# Patient Record
Sex: MALE | HISPANIC OR LATINO | Employment: FULL TIME | URBAN - METROPOLITAN AREA
[De-identification: names, ages, dates, MRNs, and addresses within clinical notes are randomized per-mention and may not be internally consistent; named-entity substitution may affect disease eponyms.]

---

## 2017-05-15 ENCOUNTER — OFFICE VISIT (OUTPATIENT)
Dept: URGENT CARE | Facility: CLINIC | Age: 57
End: 2017-05-15
Payer: COMMERCIAL

## 2017-05-15 VITALS
BODY MASS INDEX: 33.6 KG/M2 | DIASTOLIC BLOOD PRESSURE: 80 MMHG | WEIGHT: 240 LBS | TEMPERATURE: 98.6 F | SYSTOLIC BLOOD PRESSURE: 142 MMHG | HEART RATE: 84 BPM | HEIGHT: 71 IN | OXYGEN SATURATION: 96 % | RESPIRATION RATE: 16 BRPM

## 2017-05-15 DIAGNOSIS — J02.9 PHARYNGITIS, UNSPECIFIED ETIOLOGY: ICD-10-CM

## 2017-05-15 DIAGNOSIS — R50.9 FEVER, UNSPECIFIED FEVER CAUSE: ICD-10-CM

## 2017-05-15 DIAGNOSIS — K59.00 CONSTIPATION, UNSPECIFIED CONSTIPATION TYPE: ICD-10-CM

## 2017-05-15 DIAGNOSIS — J10.1 INFLUENZA A: ICD-10-CM

## 2017-05-15 LAB
INT CON NEG: NORMAL
INT CON POS: NORMAL
S PYO AG THROAT QL: NEGATIVE

## 2017-05-15 PROCEDURE — 99203 OFFICE O/P NEW LOW 30 MIN: CPT | Performed by: PHYSICIAN ASSISTANT

## 2017-05-15 PROCEDURE — 87880 STREP A ASSAY W/OPTIC: CPT | Performed by: PHYSICIAN ASSISTANT

## 2017-05-15 RX ORDER — PROMETHAZINE HYDROCHLORIDE AND CODEINE PHOSPHATE 6.25; 1 MG/5ML; MG/5ML
5-10 SYRUP ORAL
Qty: 120 ML | Refills: 0 | Status: SHIPPED | OUTPATIENT
Start: 2017-05-15 | End: 2019-07-25

## 2017-05-15 RX ORDER — DAPAGLIFLOZIN AND METFORMIN HYDROCHLORIDE 5; 1000 MG/1; MG/1
TABLET, FILM COATED, EXTENDED RELEASE ORAL
COMMUNITY

## 2017-05-15 RX ORDER — OSELTAMIVIR PHOSPHATE 75 MG/1
75 CAPSULE ORAL 2 TIMES DAILY
Qty: 10 CAP | Refills: 0 | Status: SHIPPED | OUTPATIENT
Start: 2017-05-15 | End: 2019-07-25

## 2017-05-15 ASSESSMENT — ENCOUNTER SYMPTOMS
MYALGIAS: 0
WHEEZING: 1
HEADACHES: 1
CHILLS: 1
FEVER: 1
DIZZINESS: 0
SPUTUM PRODUCTION: 1
CONSTIPATION: 1
COUGH: 1
NAUSEA: 0
PALPITATIONS: 0
VOMITING: 0
ABDOMINAL PAIN: 0
SORE THROAT: 1

## 2017-05-15 NOTE — PATIENT INSTRUCTIONS
"Analgesics:  800mg advil, ibuprofen, motrin  Alternate every 4 hrs with  650-1000mg tylenol, acetaminophen  Do not exceed this dosage of either medication, this is the maximum dosage for pain and fever.        Influenza, Adult  Influenza (\"the flu\") is a viral infection of the respiratory tract. It occurs more often in winter months because people spend more time in close contact with one another. Influenza can make you feel very sick. Influenza easily spreads from person to person (contagious).  CAUSES   Influenza is caused by a virus that infects the respiratory tract. You can catch the virus by breathing in droplets from an infected person's cough or sneeze. You can also catch the virus by touching something that was recently contaminated with the virus and then touching your mouth, nose, or eyes.  RISKS AND COMPLICATIONS  You may be at risk for a more severe case of influenza if you smoke cigarettes, have diabetes, have chronic heart disease (such as heart failure) or lung disease (such as asthma), or if you have a weakened immune system. Elderly people and pregnant women are also at risk for more serious infections. The most common problem of influenza is a lung infection (pneumonia). Sometimes, this problem can require emergency medical care and may be life threatening.  SIGNS AND SYMPTOMS   Symptoms typically last 4 to 10 days and may include:  · Fever.  · Chills.  · Headache, body aches, and muscle aches.  · Sore throat.  · Chest discomfort and cough.  · Poor appetite.  · Weakness or feeling tired.  · Dizziness.  · Nausea or vomiting.  DIAGNOSIS   Diagnosis of influenza is often made based on your history and a physical exam. A nose or throat swab test can be done to confirm the diagnosis.  TREATMENT   In mild cases, influenza goes away on its own. Treatment is directed at relieving symptoms. For more severe cases, your health care provider may prescribe antiviral medicines to shorten the sickness. " Antibiotic medicines are not effective because the infection is caused by a virus, not by bacteria.  HOME CARE INSTRUCTIONS  · Take medicines only as directed by your health care provider.  · Use a cool mist humidifier to make breathing easier.  · Get plenty of rest until your temperature returns to normal. This usually takes 3 to 4 days.  · Drink enough fluid to keep your urine clear or pale yellow.  · Cover your mouth and nose when coughing or sneezing, and wash your hands well to prevent the virus from spreading.  · Stay home from work or school until the fever is gone for at least 1 full day.  PREVENTION   An annual influenza vaccination (flu shot) is the best way to avoid getting influenza. An annual flu shot is now routinely recommended for all adults in the [place]  SEEK MEDICAL CARE IF:  · You experience chest pain, your cough worsens, or you produce more mucus.  · You have nausea, vomiting, or diarrhea.  · Your fever returns or gets worse.  SEEK IMMEDIATE MEDICAL CARE IF:  · You have trouble breathing, you become short of breath, or your skin or nails become bluish.  · You have severe pain or stiffness in the neck.  · You develop a sudden headache, or pain in the face or ear.  · You have nausea or vomiting that you cannot control.  MAKE SURE YOU:   · Understand these instructions.  · Will watch your condition.  · Will get help right away if you are not doing well or get worse.     This information is not intended to replace advice given to you by your health care provider. Make sure you discuss any questions you have with your health care provider.     Document Released: 12/15/2001 Document Revised: 01/08/2016 Document Reviewed: 03/18/2013  [employer] Interactive Patient Education ©2016 [employer]

## 2017-05-15 NOTE — MR AVS SNAPSHOT
"        Kenney Espinal   5/15/2017 12:00 PM   Office Visit   MRN: [id number]    Department:  [facility]   Dept Phone:  [phone]    Description:  Male : 1960   Provider:  Marlene Black PA-C           Reason for Visit     Fever chills, sore throat just started, congestion,       Allergies as of 5/15/2017     Allergen Noted Reactions    Nkda [No Known Drug Allergy] 2008         You were diagnosed with     Pharyngitis, unspecified etiology   [3813423]       Fever, unspecified fever cause   [8867215]       Constipation, unspecified constipation type   [9595294]       Influenza A   [047945]         Vital Signs     Blood Pressure Pulse Temperature Respirations Height Weight    142/80 mmHg 84 37 °C (98.6 °F) 16 1.803 m (5' 11\") 108.863 kg (240 lb)    Body Mass Index Oxygen Saturation Smoking Status             33.49 kg/m2 96% Never Smoker          Basic Information     Date Of Birth Sex Race Ethnicity Preferred Language    1960 Male  or   Origin (Mongolian,Maltese,Tanzanian,Charly, etc) English      Problem List              ICD-10-CM Priority Class Noted - Resolved    DIABETES MELLITUS    2013 - Present    HTN (hypertension) I10   2013 - Present    Other chest pain R07.89   2013 - Present      Health Maintenance        Date Due Completion Dates    IMM HEP B VACCINE (1 of 3 - Primary Series) 1960 ---    A1C SCREENING 1960 ---    DIABETES MONOFILAMENT / LE EXAM 1960 ---    RETINAL SCREENING 1978 ---    FASTING LIPID PROFILE 1978 ---    URINE ACR / MICROALBUMIN 1978 ---    SERUM CREATININE 1978 ---    IMM DTaP/Tdap/Td Vaccine (1 - Tdap) 1979 ---    IMM PNEUMOCOCCAL 19-64 (ADULT) MEDIUM RISK SERIES (1 of 1 - PPSV23) 1979 ---    COLONOSCOPY 2010 ---            Results     POCT Rapid Strep A      Component    Rapid Strep Screen    NEGATIVE    Internal Control Positive    Valid    Internal Control Negative   " Valid                        Current Immunizations     No immunizations on file.      Below and/or attached are the medications your provider expects you to take. Review all of your home medications and newly ordered medications with your provider and/or pharmacist. Follow medication instructions as directed by your provider and/or pharmacist. Please keep your medication list with you and share with your provider. Update the information when medications are discontinued, doses are changed, or new medications (including over-the-counter products) are added; and carry medication information at all times in the event of emergency situations     Allergies:  NKDA - (reactions not documented)               Medications  Valid as of: May 15, 2017 - 12:57 PM    Generic Name Brand Name Tablet Size Instructions for use    Albiglutide (Pen-injector) Albiglutide 50 MG Inject  as instructed.        Aspirin (Chew Tab) ASA 81 MG Take 1 Tab by mouth every day.        Atorvastatin Calcium (Tab) LIPITOR 20 MG Take 1 Tab by mouth every day.        Carvedilol (Tab) COREG 6.25 MG TAKE ONE TABLET BY MOUTH TWICE A DAY WITH MEALS        Chlorthalidone (Tab) HYGROTON 25 MG Take 1 Tab by mouth every day. OVERDUE 1 YEAR!  PLEASE CALL TO MAKE AN APPOINTMENT!  THANK YOU        Dapagliflozin-Metformin HCl (TABLET SR 24 HR) Dapagliflozin-Metformin HCl ER 5-1000 MG Take  by mouth.        Insulin Glulisine (Solution) APIDRA 100 UNIT/ML Inject  as instructed 3 times a day before meals. 3-12 units sliding scale        Insulin Lispro Prot & Lispro (Suspension) INSULIN LISP PROT & LISP (HUM) (50-50) 100 UNIT/ML Inject  as instructed.          Losartan Potassium (Tab) COZAAR 50 MG Take 50 mg by mouth every day.          MetFORMIN HCl (Tab) GLUCOPHAGE 500 MG Take 1,000 mg by mouth 2 times a day.          Oseltamivir Phosphate (Cap) TAMIFLU 75 MG Take 1 Cap by mouth 2 times a day.        Promethazine-Codeine (Syrup) PHENERGAN-CODEINE 6.25-10 MG/5ML Take  "5-10 mL by mouth at bedtime as needed for Cough (May redose every 4-6 hours PRN).        .                 Medicines prescribed today were sent to:     [facility] #[id number] - [place], [place] - [address]    [address] [place] [address]    Phone: [phone] Fax: [phone]    Open 24 Hours?: No      Medication refill instructions:       If your prescription bottle indicates you have medication refills left, it is not necessary to call your provider’s office. Please contact your pharmacy and they will refill your medication.    If your prescription bottle indicates you do not have any refills left, you may request refills at any time through one of the following ways: The online [employer] system (except Urgent Care), by calling your provider’s office, or by asking your pharmacy to contact your provider’s office with a refill request. Medication refills are processed only during regular business hours and may not be available until the next business day. Your provider may request additional information or to have a follow-up visit with you prior to refilling your medication.   *Please Note: Medication refills are assigned a new Rx number when refilled electronically. Your pharmacy may indicate that no refills were authorized even though a new prescription for the same medication is available at the pharmacy. Please request the medicine by name with the pharmacy before contacting your provider for a refill.        Instructions    Analgesics:  800mg advil, ibuprofen, motrin  Alternate every 4 hrs with  650-1000mg tylenol, acetaminophen  Do not exceed this dosage of either medication, this is the maximum dosage for pain and fever.        Influenza, Adult  Influenza (\"the flu\") is a viral infection of the respiratory tract. It occurs more often in winter months because people spend more time in close contact with one another. Influenza can make you feel very sick. Influenza easily spreads from " person to person (contagious).  CAUSES   Influenza is caused by a virus that infects the respiratory tract. You can catch the virus by breathing in droplets from an infected person's cough or sneeze. You can also catch the virus by touching something that was recently contaminated with the virus and then touching your mouth, nose, or eyes.  RISKS AND COMPLICATIONS  You may be at risk for a more severe case of influenza if you smoke cigarettes, have diabetes, have chronic heart disease (such as heart failure) or lung disease (such as asthma), or if you have a weakened immune system. Elderly people and pregnant women are also at risk for more serious infections. The most common problem of influenza is a lung infection (pneumonia). Sometimes, this problem can require emergency medical care and may be life threatening.  SIGNS AND SYMPTOMS   Symptoms typically last 4 to 10 days and may include:  · Fever.  · Chills.  · Headache, body aches, and muscle aches.  · Sore throat.  · Chest discomfort and cough.  · Poor appetite.  · Weakness or feeling tired.  · Dizziness.  · Nausea or vomiting.  DIAGNOSIS   Diagnosis of influenza is often made based on your history and a physical exam. A nose or throat swab test can be done to confirm the diagnosis.  TREATMENT   In mild cases, influenza goes away on its own. Treatment is directed at relieving symptoms. For more severe cases, your health care provider may prescribe antiviral medicines to shorten the sickness. Antibiotic medicines are not effective because the infection is caused by a virus, not by bacteria.  HOME CARE INSTRUCTIONS  · Take medicines only as directed by your health care provider.  · Use a cool mist humidifier to make breathing easier.  · Get plenty of rest until your temperature returns to normal. This usually takes 3 to 4 days.  · Drink enough fluid to keep your urine clear or pale yellow.  · Cover your mouth and nose when coughing or sneezing, and wash your  hands well to prevent the virus from spreading.  · Stay home from work or school until the fever is gone for at least 1 full day.  PREVENTION   An annual influenza vaccination (flu shot) is the best way to avoid getting influenza. An annual flu shot is now routinely recommended for all adults in the [place]  SEEK MEDICAL CARE IF:  · You experience chest pain, your cough worsens, or you produce more mucus.  · You have nausea, vomiting, or diarrhea.  · Your fever returns or gets worse.  SEEK IMMEDIATE MEDICAL CARE IF:  · You have trouble breathing, you become short of breath, or your skin or nails become bluish.  · You have severe pain or stiffness in the neck.  · You develop a sudden headache, or pain in the face or ear.  · You have nausea or vomiting that you cannot control.  MAKE SURE YOU:   · Understand these instructions.  · Will watch your condition.  · Will get help right away if you are not doing well or get worse.     This information is not intended to replace advice given to you by your health care provider. Make sure you discuss any questions you have with your health care provider.     Document Released: 12/15/2001 Document Revised: 01/08/2016 Document Reviewed: 03/18/2013  [email or web address] Interactive Patient Education ©2016 [employer].            Shock Treatment Management Access Code: [email or web address]  Expires: 6/14/2017 12:57 PM    Your email address is not on file at [facility].  Email Addresses are required for you to sign up for Shock Treatment Management, please contact [phone] to verify your personal information and to provide your email address prior to attempting to register for Shock Treatment Management.    Kenney Espinal  [address] [place] [address]    Shock Treatment Management  A secure, online tool to manage your health information     [facility]’s Shock Treatment Management® is a secure, online tool that connects you to your personalized health information from the privacy of your home -- day or night - making it very easy for you to manage your healthcare. Once  the activation process is completed, you can even access your medical information using the [employer] josé, which is available for free in the Apple José store or Google Play store.     To learn more about [employer], visit [email or web address]    There are two levels of access available (as shown below):   My Chart Features  [facility]  Urgent  Care Non-[facility] Doctor   Email your healthcare team securely and privately 24/7 X X X    Manage appointments: schedule your next appointment; view details of past/upcoming appointments X      Request prescription refills. X      View recent personal medical records, including lab and immunizations X X X X   View health record, including health history, allergies, medications X X X X   Read reports about your outpatient visits, procedures, consult and ER notes X X X X   See your discharge summary, which is a recap of your hospital and/or ER visit that includes your diagnosis, lab results, and care plan X X  X     How to register for [employer]:  Once your e-mail address has been verified, follow the following steps to sign up for [employer].     1. Go to  [email or web address]  2. Click on the Sign Up Now box, which takes you to the New Member Sign Up page. You will need to provide the following information:  a. Enter your [employer] Access Code exactly as it appears at the top of this page. (You will not need to use this code after you’ve completed the sign-up process. If you do not sign up before the expiration date, you must request a new code.)   b. Enter your date of birth.   c. Enter your home email address.   d. Click Submit, and follow the next screen’s instructions.  3. Create a [employer] ID. This will be your [employer] login ID and cannot be changed, so think of one that is secure and easy to remember.  4. Create a [employer] password. You can change your password at any time.  5. Enter your Password Reset Question and Answer.  This can be used at a later time if you forget your password.   6. Enter your e-mail address. This allows you to receive e-mail notifications when new information is available in [employer].  7. Click Sign Up. You can now view your health information.    For assistance activating your [employer] account, call [phone]

## 2017-05-15 NOTE — PROGRESS NOTES
"Subjective:      Kenney Espinal is a 57 y.o. male who presents with Fever    Current medications reviewed.  Past Medical History   Diagnosis Date   • Diabetes    • Diabetes mellitus 1/8/2013   • HTN (hypertension) 1/8/2013   • Other chest pain 1/8/2013     Social History   Substance Use Topics   • Smoking status: Never Smoker    • Smokeless tobacco: Never Used   • Alcohol Use: 2.0 oz/week     4 Cans of beer per week      Comment: social/occasional     Family History Reviewed: noncontributory      3 days ill: 101 fevers with chills, myalgias, moderate sore throat, has been constipated for the past 2 days.   Left ear is painful.  Patient is insulin dependent diabetic, he was not ill until after he picked sister up at airport, she became ill first.      Fever   Associated symptoms include congestion, coughing, ear pain (left), headaches, a sore throat and wheezing. Pertinent negatives include no abdominal pain, chest pain, nausea or vomiting.       Review of Systems   Constitutional: Positive for fever, chills and malaise/fatigue.   HENT: Positive for congestion, ear pain (left) and sore throat.    Respiratory: Positive for cough, sputum production (very mild, mostly dry cough) and wheezing.    Cardiovascular: Negative for chest pain and palpitations.   Gastrointestinal: Positive for constipation. Negative for nausea, vomiting and abdominal pain.   Musculoskeletal: Negative for myalgias and joint pain.   Neurological: Positive for headaches. Negative for dizziness.   All other systems reviewed and are negative.         Objective:     /80 mmHg  Pulse 84  Temp(Src) 37 °C (98.6 °F)  Resp 16  Ht 1.803 m (5' 11\")  Wt 108.863 kg (240 lb)  BMI 33.49 kg/m2  SpO2 96%     Physical Exam   Constitutional: He is oriented to person, place, and time. He appears well-developed and well-nourished.   HENT:   Right Ear: Ear canal normal. A middle ear effusion is present.   Left Ear: Ear canal normal. A middle ear effusion " is present.   Nose: Mucosal edema (mild with mod eryth) present.   Mouth/Throat: Posterior oropharyngeal edema (mild) and posterior oropharyngeal erythema (mild) present.   Eyes: EOM are normal. Pupils are equal, round, and reactive to light.   Cardiovascular: Normal rate and normal heart sounds.    Pulmonary/Chest: Effort normal and breath sounds normal. He has no decreased breath sounds.   Abdominal: He exhibits distension. Bowel sounds are increased. There is tenderness (mild) in the periumbilical area. There is no rigidity, no rebound, no guarding, no CVA tenderness, no tenderness at McBurney's point and negative Ferrer's sign.   Neurological: He is alert and oriented to person, place, and time.   Skin: Skin is warm and dry.   Nursing note and vitals reviewed.              Assessment/Plan:     1. Pharyngitis, unspecified etiology  POCT Rapid Strep A   2. Fever, unspecified fever cause     3. Constipation, unspecified constipation type     4. Influenza A       Strep neg in office  Sister in same clinic, tested for flu and positive for influenza A  Will start tamiflu, note given for work, all questions answered, work note given.  Prometh/Cod cough syrup: Advised no drinking etoh, driving, or operating heavy equipment while taking.  Pt was advised of the sedation effect of these medications.  reviewed.  Patient reports understanding and agrees with plan.

## 2017-05-15 NOTE — Clinical Note
May 15, 2017         Patient: Kenney Espinal   YOB: 1960   Date of Visit: 5/15/2017           To Whom it May Concern:    Kenney Espinal was seen in my clinic on 5/15/2017. He may return to work when fever has resolved for 24 hrs, Influenza A is in his household.    If you have any questions or concerns, please don't hesitate to call.        Sincerely,           Marlene Black PA-C  Electronically Signed

## 2019-01-21 ENCOUNTER — TELEPHONE (OUTPATIENT)
Dept: MEDICAL GROUP | Facility: MEDICAL CENTER | Age: 59
End: 2019-01-21

## 2019-01-21 NOTE — TELEPHONE ENCOUNTER
VOICEMAIL  1. Caller Name: Kenney Espinal                        Call Back Number: [phone] (home) [phone] (work)     2. Message: Patient called and left message wanting to know if Dr. Andrew is taking new patient. I have called patient back and left a message stating Dr. Andrew is taking new patient but we are booked out until June and that we are in [place].     3. Patient approves office to leave a detailed voicemail/MyChart message: N\A

## 2019-01-24 ENCOUNTER — HOSPITAL ENCOUNTER (OUTPATIENT)
Dept: LAB | Facility: MEDICAL CENTER | Age: 59
End: 2019-01-24
Attending: CHIROPRACTOR
Payer: COMMERCIAL

## 2019-01-24 ENCOUNTER — HOSPITAL ENCOUNTER (OUTPATIENT)
Dept: LAB | Facility: MEDICAL CENTER | Age: 59
End: 2019-01-24
Attending: PHYSICIAN ASSISTANT
Payer: COMMERCIAL

## 2019-01-24 LAB
25(OH)D3 SERPL-MCNC: 33 NG/ML (ref 30–100)
ERYTHROCYTE [SEDIMENTATION RATE] IN BLOOD BY WESTERGREN METHOD: 15 MM/HOUR (ref 0–20)
EST. AVERAGE GLUCOSE BLD GHB EST-MCNC: 197 MG/DL
HBA1C MFR BLD: 8.5 % (ref 0–5.6)

## 2019-01-24 PROCEDURE — 82306 VITAMIN D 25 HYDROXY: CPT

## 2019-01-24 PROCEDURE — 85652 RBC SED RATE AUTOMATED: CPT

## 2019-01-24 PROCEDURE — 83036 HEMOGLOBIN GLYCOSYLATED A1C: CPT

## 2019-01-24 PROCEDURE — 82985 ASSAY OF GLYCATED PROTEIN: CPT

## 2019-01-24 PROCEDURE — 82043 UR ALBUMIN QUANTITATIVE: CPT

## 2019-01-24 PROCEDURE — 36415 COLL VENOUS BLD VENIPUNCTURE: CPT

## 2019-01-24 PROCEDURE — 82570 ASSAY OF URINE CREATININE: CPT

## 2019-01-25 LAB — FRUCTOSAMINE SERPL-SCNC: 308 UMOL/L (ref 170–285)

## 2019-01-26 LAB
CREAT UR-MCNC: 120.4 MG/DL
MICROALBUMIN UR-MCNC: 5 MG/DL
MICROALBUMIN/CREAT UR: 42 MG/G (ref 0–30)

## 2019-01-28 ENCOUNTER — TELEPHONE (OUTPATIENT)
Dept: MEDICAL GROUP | Facility: MEDICAL CENTER | Age: 59
End: 2019-01-28

## 2019-01-28 NOTE — TELEPHONE ENCOUNTER
VOICEMAIL  1. Caller Name: Kenney Espinal                        Call Back Number: [phone] (home) [phone] (work)    2. Message: Patient called and left message wanting to know if Dr. Andrew is taking new patient. I have called patient back and left a message stating Dr. Andrew is taking new patient but we are booked out until June and that we are in [place].  (Patient called a second time)     3. Patient approves office to leave a detailed voicemail/MyChart message: yes

## 2019-04-18 ENCOUNTER — OFFICE VISIT (OUTPATIENT)
Dept: URGENT CARE | Facility: MEDICAL CENTER | Age: 59
End: 2019-04-18
Payer: COMMERCIAL

## 2019-04-18 VITALS
HEART RATE: 87 BPM | WEIGHT: 240.8 LBS | BODY MASS INDEX: 33.71 KG/M2 | HEIGHT: 71 IN | OXYGEN SATURATION: 98 % | TEMPERATURE: 98.1 F | DIASTOLIC BLOOD PRESSURE: 84 MMHG | SYSTOLIC BLOOD PRESSURE: 142 MMHG

## 2019-04-18 DIAGNOSIS — R05.9 COUGH: ICD-10-CM

## 2019-04-18 DIAGNOSIS — J22 LRTI (LOWER RESPIRATORY TRACT INFECTION): ICD-10-CM

## 2019-04-18 PROCEDURE — 99214 OFFICE O/P EST MOD 30 MIN: CPT | Performed by: NURSE PRACTITIONER

## 2019-04-18 RX ORDER — BENZONATATE 100 MG/1
100 CAPSULE ORAL 3 TIMES DAILY PRN
Qty: 60 CAP | Refills: 0 | Status: SHIPPED | OUTPATIENT
Start: 2019-04-18 | End: 2019-07-25

## 2019-04-18 RX ORDER — DOXYCYCLINE HYCLATE 100 MG/1
100 CAPSULE ORAL 2 TIMES DAILY
Qty: 20 CAP | Refills: 0 | Status: SHIPPED | OUTPATIENT
Start: 2019-04-18 | End: 2019-04-28

## 2019-04-18 ASSESSMENT — ENCOUNTER SYMPTOMS
SWEATS: 0
WHEEZING: 0
NAUSEA: 0
MYALGIAS: 1
SHORTNESS OF BREATH: 1
HEMOPTYSIS: 0
WEIGHT LOSS: 0
CHILLS: 1
HEADACHES: 1
HEARTBURN: 0
COUGH: 1
SORE THROAT: 0
RHINORRHEA: 0
FEVER: 0

## 2019-04-18 ASSESSMENT — LIFESTYLE VARIABLES: SUBSTANCE_ABUSE: 0

## 2019-04-18 ASSESSMENT — COPD QUESTIONNAIRES: COPD: 0

## 2019-04-18 NOTE — LETTER
April 18, 2019       Patient: Kenney Espinal   YOB: 1960   Date of Visit: 4/18/2019         To Whom It May Concern:    It is my medical opinion that Kenney Espinal remain out of work until 04/20/19 due to medical illness.              Sincerely,          TABITHA Bob  Electronically Signed

## 2019-04-19 NOTE — PROGRESS NOTES
"Subjective:      Kenney Espinal is a 58 y.o. male who presents with Cough (x1 week; dry cough; headache; body aches)    Reviewed past medical, surgical and family history with patient. Reviewed prescription and OTC medications with patient in electronic health record today.       Allergies   Allergen Reactions   • Nkda [No Known Drug Allergy]              Cough   This is a new problem. The current episode started in the past 7 days. The problem has been gradually worsening. The cough is non-productive. Associated symptoms include chills, ear pain, headaches, myalgias, nasal congestion, postnasal drip and shortness of breath. Pertinent negatives include no chest pain, ear congestion, fever, heartburn, hemoptysis, rash, rhinorrhea, sore throat, sweats, weight loss or wheezing. Nothing aggravates the symptoms. He has tried OTC cough suppressant for the symptoms. The treatment provided mild relief. There is no history of asthma, bronchiectasis, bronchitis, COPD, emphysema, environmental allergies or pneumonia.       Review of Systems   Constitutional: Positive for chills. Negative for fever and weight loss.   HENT: Positive for ear pain and postnasal drip. Negative for rhinorrhea and sore throat.    Respiratory: Positive for cough and shortness of breath. Negative for hemoptysis and wheezing.    Cardiovascular: Negative for chest pain.   Gastrointestinal: Negative for heartburn and nausea.   Musculoskeletal: Positive for myalgias.   Skin: Negative for rash.   Neurological: Positive for headaches.   Endo/Heme/Allergies: Negative for environmental allergies.   Psychiatric/Behavioral: Negative for substance abuse.          Objective:     /84   Pulse 87   Temp 36.7 °C (98.1 °F) (Temporal)   Ht 1.803 m (5' 11\")   Wt 109.2 kg (240 lb 12.8 oz)   SpO2 98%   BMI 33.58 kg/m²      Physical Exam   Constitutional: He is oriented to person, place, and time. Vital signs are normal. He appears well-developed and " well-nourished.  Non-toxic appearance. No distress.   HENT:   Head: Normocephalic and atraumatic.   Right Ear: External ear normal.   Left Ear: External ear normal.   Nose: Nose normal.   Mouth/Throat: Oropharynx is clear and moist.   Eyes: Pupils are equal, round, and reactive to light. Conjunctivae are normal. Right eye exhibits no discharge. Left eye exhibits no discharge.   Neck: Neck supple.   Cardiovascular: Normal rate and regular rhythm.    Pulmonary/Chest: Effort normal. No accessory muscle usage. He has rhonchi (+ egophony , + dullness by percussion) in the left lower field.   Lymphadenopathy:     He has no cervical adenopathy.        Right: No supraclavicular adenopathy present.        Left: No supraclavicular adenopathy present.   Neurological: He is alert and oriented to person, place, and time.   Skin: Skin is warm and dry.   Nursing note and vitals reviewed.              Assessment/Plan:     1. LRTI (lower respiratory tract infection)  doxycycline (VIBRAMYCIN) 100 MG Cap   2. Cough  benzonatate (TESSALON) 100 MG Cap       Educated in proper administration of medication(s) ordered today including safety, possible SE, risks, benefits, rationale and alternatives to therapy.     Return to clinic or PCP 5-7  days if current symptoms are not resolving in a satisfactory manner or sooner if new or worsening symptoms occur.   Patient was advised of signs and symptoms which would warrant further evaluation and /or emergent evaluation in ER.  Verbalized agreement with this treatment plan and seemed to understand without barriers. Questions were encouraged and answered to patients satisfaction.   Aftercare instructions were given to pt    Keep well hydrated

## 2019-06-24 ENCOUNTER — HOSPITAL ENCOUNTER (OUTPATIENT)
Dept: LAB | Facility: MEDICAL CENTER | Age: 59
End: 2019-06-24
Attending: INTERNAL MEDICINE
Payer: COMMERCIAL

## 2019-06-24 LAB
ALBUMIN SERPL BCP-MCNC: 4.2 G/DL (ref 3.2–4.9)
ALBUMIN/GLOB SERPL: 1.6 G/DL
ALP SERPL-CCNC: 58 U/L (ref 30–99)
ALT SERPL-CCNC: 14 U/L (ref 2–50)
ANION GAP SERPL CALC-SCNC: 7 MMOL/L (ref 0–11.9)
AST SERPL-CCNC: 19 U/L (ref 12–45)
BILIRUB SERPL-MCNC: 0.7 MG/DL (ref 0.1–1.5)
BUN SERPL-MCNC: 16 MG/DL (ref 8–22)
CALCIUM SERPL-MCNC: 9.2 MG/DL (ref 8.5–10.5)
CHLORIDE SERPL-SCNC: 104 MMOL/L (ref 96–112)
CHOLEST SERPL-MCNC: 112 MG/DL (ref 100–199)
CO2 SERPL-SCNC: 28 MMOL/L (ref 20–33)
CREAT SERPL-MCNC: 0.87 MG/DL (ref 0.5–1.4)
EST. AVERAGE GLUCOSE BLD GHB EST-MCNC: 194 MG/DL
FASTING STATUS PATIENT QL REPORTED: NORMAL
GLOBULIN SER CALC-MCNC: 2.7 G/DL (ref 1.9–3.5)
GLUCOSE SERPL-MCNC: 72 MG/DL (ref 65–99)
HBA1C MFR BLD: 8.4 % (ref 0–5.6)
HDLC SERPL-MCNC: 50 MG/DL
LDLC SERPL CALC-MCNC: 49 MG/DL
POTASSIUM SERPL-SCNC: 4 MMOL/L (ref 3.6–5.5)
PROT SERPL-MCNC: 6.9 G/DL (ref 6–8.2)
SODIUM SERPL-SCNC: 139 MMOL/L (ref 135–145)
TRIGL SERPL-MCNC: 65 MG/DL (ref 0–149)

## 2019-06-24 PROCEDURE — 80053 COMPREHEN METABOLIC PANEL: CPT

## 2019-06-24 PROCEDURE — 80061 LIPID PANEL: CPT

## 2019-06-24 PROCEDURE — 83036 HEMOGLOBIN GLYCOSYLATED A1C: CPT

## 2019-06-24 PROCEDURE — 36415 COLL VENOUS BLD VENIPUNCTURE: CPT

## 2019-07-25 ENCOUNTER — OFFICE VISIT (OUTPATIENT)
Dept: MEDICAL GROUP | Facility: MEDICAL CENTER | Age: 59
End: 2019-07-25
Payer: COMMERCIAL

## 2019-07-25 VITALS
RESPIRATION RATE: 14 BRPM | WEIGHT: 237.22 LBS | TEMPERATURE: 98.2 F | BODY MASS INDEX: 33.21 KG/M2 | DIASTOLIC BLOOD PRESSURE: 88 MMHG | SYSTOLIC BLOOD PRESSURE: 126 MMHG | OXYGEN SATURATION: 96 % | HEIGHT: 71 IN | HEART RATE: 69 BPM

## 2019-07-25 DIAGNOSIS — Z79.4 TYPE 2 DIABETES MELLITUS WITH BOTH EYES AFFECTED BY RETINOPATHY WITHOUT MACULAR EDEMA, WITH LONG-TERM CURRENT USE OF INSULIN, UNSPECIFIED RETINOPATHY SEVERITY (HCC): ICD-10-CM

## 2019-07-25 DIAGNOSIS — E11.319 TYPE 2 DIABETES MELLITUS WITH BOTH EYES AFFECTED BY RETINOPATHY WITHOUT MACULAR EDEMA, WITH LONG-TERM CURRENT USE OF INSULIN, UNSPECIFIED RETINOPATHY SEVERITY (HCC): ICD-10-CM

## 2019-07-25 DIAGNOSIS — E66.9 OBESITY (BMI 30-39.9): ICD-10-CM

## 2019-07-25 DIAGNOSIS — E78.2 MIXED HYPERLIPIDEMIA: ICD-10-CM

## 2019-07-25 DIAGNOSIS — G89.29 CHRONIC PAIN OF BOTH SHOULDERS: ICD-10-CM

## 2019-07-25 DIAGNOSIS — I10 ESSENTIAL HYPERTENSION: ICD-10-CM

## 2019-07-25 DIAGNOSIS — M25.511 CHRONIC PAIN OF BOTH SHOULDERS: ICD-10-CM

## 2019-07-25 DIAGNOSIS — K21.00 GASTROESOPHAGEAL REFLUX DISEASE WITH ESOPHAGITIS: ICD-10-CM

## 2019-07-25 DIAGNOSIS — M25.512 CHRONIC PAIN OF BOTH SHOULDERS: ICD-10-CM

## 2019-07-25 PROCEDURE — 99204 OFFICE O/P NEW MOD 45 MIN: CPT | Performed by: FAMILY MEDICINE

## 2019-07-25 RX ORDER — CARVEDILOL 6.25 MG/1
6.25 TABLET ORAL 2 TIMES DAILY WITH MEALS
COMMUNITY

## 2019-07-25 RX ORDER — ATORVASTATIN CALCIUM 40 MG/1
40 TABLET, FILM COATED ORAL NIGHTLY
COMMUNITY

## 2019-07-25 RX ORDER — OMEPRAZOLE 20 MG/1
20 CAPSULE, DELAYED RELEASE ORAL DAILY
Qty: 90 CAP | Refills: 0 | Status: SHIPPED | OUTPATIENT
Start: 2019-07-25 | End: 2019-11-12 | Stop reason: SDUPTHER

## 2019-07-25 ASSESSMENT — PATIENT HEALTH QUESTIONNAIRE - PHQ9: CLINICAL INTERPRETATION OF PHQ2 SCORE: 0

## 2019-07-25 NOTE — LETTER
[facility]  Liza Andrew M.D.  [address]  [place] [address]  Fax: [phone]   Authorization for Release/Disclosure of   Protected Health Information   Name: PAULINA ESPINAL : 1960 SSN: [id number]   Address: [address] [place] [address]  [place] [address] Phone:    [phone] (home) [phone] (work)   I authorize the entity listed below to release/disclose the PHI below to:   [facility]/Liza Andrew M.D. and Liza Andrew M.D.   Provider or Entity Name:     Address   City, State, Zip   Phone:      Fax:     Reason for request: continuity of care   Information to be released:    [  ] LAST COLONOSCOPY,  including any PATH REPORT and follow-up  [  ] LAST FIT/COLOGUARD RESULT [  ] LAST DEXA  [  ] LAST MAMMOGRAM  [  ] LAST PAP  [  ] LAST LABS [  ] RETINA EXAM REPORT  [  ] IMMUNIZATION RECORDS  [  ] Release all info      [  ] Check here and initial the line next to each item to release ALL health information INCLUDING  _____ Care and treatment for drug and / or alcohol abuse  _____ HIV testing, infection status, or AIDS  _____ Genetic Testing    DATES OF SERVICE OR TIME PERIOD TO BE DISCLOSED: _____________  I understand and acknowledge that:  * This Authorization may be revoked at any time by you in writing, except if your health information has already been used or disclosed.  * Your health information that will be used or disclosed as a result of you signing this authorization could be re-disclosed by the recipient. If this occurs, your re-disclosed health information may no longer be protected by State or Federal laws.  * You may refuse to sign this Authorization. Your refusal will not affect your ability to obtain treatment.  * This Authorization becomes effective upon signing and will  on (date) __________.      If no date is indicated, this Authorization will  one (1) year from the signature date.    Name: Paulina Espinal    Signature:   Date:     2019       PLEASE FAX REQUESTED RECORDS BACK TO: [phone]

## 2019-07-25 NOTE — ASSESSMENT & PLAN NOTE
This is a chronic health problem for this patient that was diagnosed approximately 1996.  He sees Dr. Laura Blanc and she is working with him to try and get his diabetes under better control.  His fasting glucose in June was excellent at 72 but his A1c is still slightly elevated at 8.4.  Would like to see it down in the sevens.  Initially it was up in the 10 level so they have done a great job helping it to come down.  We will continue to monitor.

## 2019-07-25 NOTE — ASSESSMENT & PLAN NOTE
This is a chronic problem going on for nearly a month.  Patient notes that the right shoulder is particularly bad he has difficulty even trying to pick things up with his right hand.  His abduction only goes to 90 degrees and his empty can test is positive on the right.  He states that his left shoulder is very similar.  He has had some diagnostic studies done at [facility].  We will get those records and then go over them when we see him back.

## 2019-07-25 NOTE — ASSESSMENT & PLAN NOTE
This is a new problem ongoing about 5 weeks.  Patient has noted that when he eats he will have to cough once or twice and then that seems to clear things but he is having more acid production.  I am going to have him try a proton pump inhibitor such as omeprazole 20 mg daily.  He has to take it 30 minutes before protein containing meal which for him were going to have it be before his morning yogurt.

## 2019-07-25 NOTE — ASSESSMENT & PLAN NOTE
This is a chronic health problem for this patient that is very well controlled on atorvastatin 40 mg daily.  Patient just did blood work for his endocrinologist and his total cholesterol is down to 112, triglycerides 65, HDL 50 LDL is 49.  There is no liver dysfunction.  He will continue with those same meds.

## 2019-07-25 NOTE — ASSESSMENT & PLAN NOTE
This is a chronic health problem for this patient for which she is on carvedilol 6.25 mg twice daily and losartan 50 mg daily.  He did blood work in June and his comprehensive metabolic panel was normal with a GFR greater than 60.  Blood pressure today is 126/88.  He is meeting me for the first time.  We will continue to monitor his blood pressures.  Ideally would like to see him less than 120/80.

## 2019-07-26 NOTE — PROGRESS NOTES
CC:  Diagnoses of Mixed hyperlipidemia, Essential hypertension, Type 2 diabetes mellitus with both eyes affected by retinopathy without macular edema, with long-term current use of insulin, unspecified retinopathy severity (HCC), Gastroesophageal reflux disease with esophagitis, Chronic pain of both shoulders, and Obesity (BMI 30-39.9) were pertinent to this visit.    HISTORY OF THE PRESENT ILLNESS: Patient is a 59 y.o. male. This pleasant patient is here today to establish care.  He previously was seen by Dr. Shukla with [facility].  We will request those records.  Health Maintenance: Completed      Mixed hyperlipidemia  This is a chronic health problem for this patient that is very well controlled on atorvastatin 40 mg daily.  Patient just did blood work for his endocrinologist and his total cholesterol is down to 112, triglycerides 65, HDL 50 LDL is 49.  There is no liver dysfunction.  He will continue with those same meds.    HTN (hypertension)  This is a chronic health problem for this patient for which she is on carvedilol 6.25 mg twice daily and losartan 50 mg daily.  He did blood work in June and his comprehensive metabolic panel was normal with a GFR greater than 60.  Blood pressure today is 126/88.  He is meeting me for the first time.  We will continue to monitor his blood pressures.  Ideally would like to see him less than 120/80.    Type 2 diabetes mellitus with retinopathy, with long-term current use of insulin (HCC)  This is a chronic health problem for this patient that was diagnosed approximately 1996.  He sees Dr. Laura Blanc and she is working with him to try and get his diabetes under better control.  His fasting glucose in June was excellent at 72 but his A1c is still slightly elevated at 8.4.  Would like to see it down in the sevens.  Initially it was up in the 10 level so they have done a great job helping it to come down.  We will continue to monitor.    Gastroesophageal reflux disease with  esophagitis  This is a new problem ongoing about 5 weeks.  Patient has noted that when he eats he will have to cough once or twice and then that seems to clear things but he is having more acid production.  I am going to have him try a proton pump inhibitor such as omeprazole 20 mg daily.  He has to take it 30 minutes before protein containing meal which for him were going to have it be before his morning yogurt.    Chronic pain of both shoulders  This is a chronic problem going on for nearly a month.  Patient notes that the right shoulder is particularly bad he has difficulty even trying to pick things up with his right hand.  His abduction only goes to 90 degrees and his empty can test is positive on the right.  He states that his left shoulder is very similar.  He has had some diagnostic studies done at [facility].  We will get those records and then go over them when we see him back.    Allergies: Nkda [no known drug allergy]    Current Outpatient Prescriptions Ordered in [facility]   Medication Sig Dispense Refill   • Dulaglutide (TRULICITY) 0.75 MG/0.5ML Solution Pen-injector Inject  as instructed.     • NOVOLOG, insulin aspart, (NOVOLOG FLEXPEN) 100 UNIT/ML Solution Pen-injector injection Inject 10 Units as instructed every day.     • Insulin Glargine (TOUJEO MAX SOLOSTAR) 300 UNIT/ML Solution Pen-injector Inject 64 Units as instructed every day.     • carvedilol (COREG) 6.25 MG Tab Take 6.25 mg by mouth 2 times a day, with meals.     • atorvastatin (LIPITOR) 40 MG Tab Take 40 mg by mouth every evening.     • omeprazole (PRILOSEC) 20 MG delayed-release capsule Take 1 Cap by mouth every day. 90 Cap 0   • Dapagliflozin-Metformin HCl ER (XIGDUO XR) 5-1000 MG TABLET SR 24 HR Take  by mouth.     • Albiglutide (TANZEUM) 50 MG Pen-injector Inject  as instructed.     • losartan (COZAAR) 50 MG TABS Take 50 mg by mouth every day.         No current Epic-ordered facility-administered medications on file.        Past  Medical History:   Diagnosis Date   • Chronic pain of both shoulders 7/25/2019   • Diabetes    • DIABETES MELLITUS 1/8/2013   • Gastroesophageal reflux disease with esophagitis 7/25/2019   • HTN (hypertension) 1/8/2013   • Mixed hyperlipidemia 7/25/2019   • Other chest pain 1/8/2013       Past Surgical History:   Procedure Laterality Date   • OTHER ORTHOPEDIC SURGERY      L knee   • TONSILLECTOMY         Social History   Substance Use Topics   • Smoking status: Never Smoker   • Smokeless tobacco: Never Used   • Alcohol use No      Comment: social/occasional       Social History     Social History Narrative   • No narrative on file       Family History   Problem Relation Age of Onset   • Diabetes Mother    • Hypertension Mother    • Hyperlipidemia Mother    • Diabetes Father    • Hypertension Father    • Hyperlipidemia Father    • Cancer Sister         breast       ROS:     - Constitutional: Negative for fever, chills, unexpected weight change, and fatigue/generalized weakness.     - HEENT: Negative for headaches, vision changes, hearing changes, ear pain, ear discharge, rhinorrhea, sinus congestion, sore throat, and neck pain.      - Respiratory: Negative for cough, sputum production, chest congestion, dyspnea, wheezing, and crackles.      - Cardiovascular: Negative for chest pain, palpitations, orthopnea, and bilateral lower extremity edema.     - Gastrointestinal: Positive for heartburn otherwise denies, nausea, vomiting, abdominal pain, hematochezia, melena, diarrhea, constipation, and greasy/foul-smelling stools.     - Genitourinary: Negative for dysuria, polyuria, hematuria, pyuria, urinary urgency, and urinary incontinence.     - Musculoskeletal: Positive for bilateral shoulder pain worsening over the last year.      - Skin: Negative for rash, itching, cyanotic skin color change.     - Neurological: Negative for dizziness, tingling, tremors, focal sensory deficit, focal weakness and headaches.     -  "Endo/Heme/Allergies: Does not bruise/bleed easily.     - Psychiatric/Behavioral: Negative for depression, suicidal/homicidal ideation and memory loss.        - NOTE: All other systems reviewed and are negative, except as in HPI.      .      Exam: /88 (BP Location: Left arm, Patient Position: Sitting, BP Cuff Size: Adult)   Pulse 69   Temp 36.8 °C (98.2 °F) (Temporal)   Resp 14   Ht 1.803 m (5' 11\")   Wt 107.6 kg (237 lb 3.4 oz)   SpO2 96%  Body mass index is 33.08 kg/m².    General: Normal appearing. No distress.  HEENT: Normocephalic. Eyes conjunctiva clear lids without ptosis, pupils equal and reactive to light accommodation, ears normal shape and contour, canals are clear bilaterally, tympanic membranes are benign, nasal mucosa benign, oropharynx is without erythema, edema or exudates.   Neck: Supple without JVD or bruit. Thyroid is not enlarged.  Pulmonary: Clear to ausculation.  Normal effort. No rales, ronchi, or wheezing.  Cardiovascular: Regular rate and rhythm without murmur. Carotid and radial pulses are intact and equal bilaterally.  Abdomen: Soft, nontender, nondistended. Normal bowel sounds. Liver and spleen are not palpable  Neurologic: Grossly nonfocal  Lymph: No cervical, supraclavicular or axillary lymph nodes are palpable  Skin: Warm and dry.  No obvious lesions.  Musculoskeletal: Normal gait. No extremity cyanosis, clubbing, or edema.  Shoulders: Left: Full range of motion without deficit, pain with resisted abduction and abduction at 90 degrees.  Right: Decreased range of motion to only 90 degrees abduction, empty can test is positive, reduced internal rotation posterior extension.  Psych: Normal mood and affect. Alert and oriented x3. Judgment and insight is normal.  LABS: 6/24/2019: Results reviewed and discussed with the patient, questions answered.    Please note that this dictation was created using voice recognition software. I have made every reasonable attempt to correct " obvious errors, but I expect that there are errors of grammar and possibly content that I did not discover before finalizing the note.      Assessment/Plan  1. Mixed hyperlipidemia  Controlled, continue with current meds and lifestyle.      2. Essential hypertension  Controlled, continue with current meds and lifestyle.      3. Type 2 diabetes mellitus with both eyes affected by retinopathy without macular edema, with long-term current use of insulin, unspecified retinopathy severity (HCC)  Uncontrolled, patient working on lifestyle management along with medication and following with Dr. Blanc    4. Gastroesophageal reflux disease with esophagitis  Uncontrolled, we are going to make some changes in how he takes his medication see if this is helpful.    5. Chronic pain of both shoulders  Uncontrolled, patient states that he has had imaging of his shoulders and we need to get those results and then discuss next steps.    6. Obesity (BMI 30-39.9)  Uncontrolled, patient needs to continue to work on weight loss  - Patient identified as having weight management issue.  Appropriate orders and counseling given.

## 2019-08-22 ENCOUNTER — OFFICE VISIT (OUTPATIENT)
Dept: MEDICAL GROUP | Facility: MEDICAL CENTER | Age: 59
End: 2019-08-22
Payer: COMMERCIAL

## 2019-08-22 VITALS
OXYGEN SATURATION: 96 % | HEIGHT: 71 IN | WEIGHT: 235.89 LBS | BODY MASS INDEX: 33.02 KG/M2 | TEMPERATURE: 98.3 F | RESPIRATION RATE: 12 BRPM | SYSTOLIC BLOOD PRESSURE: 122 MMHG | HEART RATE: 76 BPM | DIASTOLIC BLOOD PRESSURE: 84 MMHG

## 2019-08-22 DIAGNOSIS — I10 ESSENTIAL HYPERTENSION: ICD-10-CM

## 2019-08-22 DIAGNOSIS — G89.29 CHRONIC PAIN OF BOTH SHOULDERS: ICD-10-CM

## 2019-08-22 DIAGNOSIS — K21.00 GASTROESOPHAGEAL REFLUX DISEASE WITH ESOPHAGITIS: ICD-10-CM

## 2019-08-22 DIAGNOSIS — M25.512 CHRONIC PAIN OF BOTH SHOULDERS: ICD-10-CM

## 2019-08-22 DIAGNOSIS — M25.511 CHRONIC PAIN OF BOTH SHOULDERS: ICD-10-CM

## 2019-08-22 PROCEDURE — 99214 OFFICE O/P EST MOD 30 MIN: CPT | Performed by: FAMILY MEDICINE

## 2019-08-22 NOTE — ASSESSMENT & PLAN NOTE
This is a chronic health problem that is well controlled with current medications and lifestyle measures.  He is doing his omeprazole 30 minutes before protein containing meal and has found that that has greatly decreased the amount of reflux that he was having.  Patient feels like he is doing well overall.  We will continue the omeprazole long-term.

## 2019-08-22 NOTE — PROGRESS NOTES
CC:Diagnoses of Gastroesophageal reflux disease with esophagitis, Essential hypertension, and Chronic pain of both shoulders were pertinent to this visit.    HISTORY OF PRESENT ILLNESS: Patient is a 59 y.o. male established patient who presents today to talk about his chronic health problems including his hypertension, chronic pain in both shoulders and his reflux.  He quickly informs me that the reflux has gotten much much better since starting medication.  He did do his x-rays over at [facility] and we called for those results.    Health Maintenance: Completed    Gastroesophageal reflux disease with esophagitis  This is a chronic health problem that is well controlled with current medications and lifestyle measures.  He is doing his omeprazole 30 minutes before protein containing meal and has found that that has greatly decreased the amount of reflux that he was having.  Patient feels like he is doing well overall.  We will continue the omeprazole long-term.    HTN (hypertension)  This is a chronic health problem that is well controlled with current medications and lifestyle measures.  His blood pressure today 122/84.  Back in April he was running in the 140s/80s.  He is also lost weight and is doing more walking all of that has helped his blood pressure to normalize.    Chronic pain of both shoulders  This is a chronic problem for this patient where we did do an x-ray of the left shoulder because it was his worst shoulder.  It comes back showing mild osteoarthritis of the AC joint as well as the glenohumeral joints and degenerative changes along the greater tuberosity.  This patient definitely needs to see an orthopedist for some injections.  He previously has seen Dr. Franco at [facility] we will refer him back.      Patient Active Problem List    Diagnosis Date Noted   • Mixed hyperlipidemia 07/25/2019   • Gastroesophageal reflux disease with esophagitis 07/25/2019   • Chronic pain of both shoulders 07/25/2019   •  Obesity (BMI 30-39.9) 07/25/2019   • Type 2 diabetes mellitus with retinopathy, with long-term current use of insulin (HCC) 01/08/2013   • HTN (hypertension) 01/08/2013      Allergies:Nkda [no known drug allergy]    Current Outpatient Medications   Medication Sig Dispense Refill   • Dulaglutide (TRULICITY) 0.75 MG/0.5ML Solution Pen-injector Inject  as instructed.     • NOVOLOG, insulin aspart, (NOVOLOG FLEXPEN) 100 UNIT/ML Solution Pen-injector injection Inject 10 Units as instructed every day.     • Insulin Glargine (TOUJEO MAX SOLOSTAR) 300 UNIT/ML Solution Pen-injector Inject 64 Units as instructed every day.     • carvedilol (COREG) 6.25 MG Tab Take 6.25 mg by mouth 2 times a day, with meals.     • atorvastatin (LIPITOR) 40 MG Tab Take 40 mg by mouth every evening.     • omeprazole (PRILOSEC) 20 MG delayed-release capsule Take 1 Cap by mouth every day. 90 Cap 0   • Dapagliflozin-Metformin HCl ER (XIGDUO XR) 5-1000 MG TABLET SR 24 HR Take  by mouth.     • Albiglutide (TANZEUM) 50 MG Pen-injector Inject  as instructed.     • losartan (COZAAR) 50 MG TABS Take 50 mg by mouth every day.         No current facility-administered medications for this visit.        Social History     Tobacco Use   • Smoking status: Never Smoker   • Smokeless tobacco: Never Used   Substance Use Topics   • Alcohol use: No     Comment: social/occasional   • Drug use: No     Social History     Social History Narrative   • Not on file       Family History   Problem Relation Age of Onset   • Diabetes Mother    • Hypertension Mother    • Hyperlipidemia Mother    • Diabetes Father    • Hypertension Father    • Hyperlipidemia Father    • Cancer Sister         breast        ROS:     - Constitutional:  Negative for fever, chills, unexpected weight change, and fatigue/generalized weakness.    - HEENT:  Negative for headaches, vision changes, hearing changes, ear pain, ear discharge, rhinorrhea, sinus congestion, sore throat, and neck pain.      -  "Respiratory: Negative for cough, sputum production, chest congestion, dyspnea, wheezing, and crackles.      - Cardiovascular: Negative for chest pain, palpitations, orthopnea, and bilateral lower extremity edema.     - Gastrointestinal: Positive improvement in heartburn denies nausea, vomiting, abdominal pain, hematochezia, melena, diarrhea, constipation, and greasy/foul-smelling stools.     - Genitourinary: Negative for dysuria, polyuria, hematuria, pyuria, urinary urgency, and urinary incontinence.     - Musculoskeletal: Continued problem with pain in both shoulders left greater than right x-rays show degenerative arthritis in the before meals and glenohumeral joints. .       Exam:    /84 (BP Location: Left arm, Patient Position: Sitting, BP Cuff Size: Adult)   Pulse 76   Temp 36.8 °C (98.3 °F) (Temporal)   Resp 12   Ht 1.803 m (5' 11\")   Wt 107 kg (235 lb 14.3 oz)   SpO2 96%  Body mass index is 32.9 kg/m².    General:  Well nourished, well developed male in NAD  Head is grossly normal.  X-ray: 8/28/2018: Results reviewed and questions answered  Please note that this dictation was created using voice recognition software. I have made every reasonable attempt to correct obvious errors, but I expect that there are errors of grammar and possibly content that I did not discover before finalizing the note.    Assessment/Plan:  1. Gastroesophageal reflux disease with esophagitis  Controlled, continue with current meds and lifestyle.      2. Essential hypertension  Controlled, continue with current meds and lifestyle.      3. Chronic pain of both shoulders  Uncontrolled, patient is previously seen Dr. Franco at the [facility].  I have asked him to call and make an appointment for about 2 weeks out to make certain that [facility] is approved his referral by then.  He is going to need at least injections in the left shoulder and then they can decide what to do on the right afterwards.  - " REFERRAL TO ORTHOPEDICS

## 2019-08-22 NOTE — ASSESSMENT & PLAN NOTE
This is a chronic health problem that is well controlled with current medications and lifestyle measures.  His blood pressure today 122/84.  Back in April he was running in the 140s/80s.  He is also lost weight and is doing more walking all of that has helped his blood pressure to normalize.

## 2019-08-22 NOTE — ASSESSMENT & PLAN NOTE
This is a chronic problem for this patient where we did do an x-ray of the left shoulder because it was his worst shoulder.  It comes back showing mild osteoarthritis of the AC joint as well as the glenohumeral joints and degenerative changes along the greater tuberosity.  This patient definitely needs to see an orthopedist for some injections.  He previously has seen Dr. Franco at [facility] we will refer him back.

## 2019-10-28 ENCOUNTER — HOSPITAL ENCOUNTER (OUTPATIENT)
Dept: LAB | Facility: MEDICAL CENTER | Age: 59
End: 2019-10-28
Attending: PHYSICIAN ASSISTANT
Payer: COMMERCIAL

## 2019-10-28 LAB
CREAT UR-MCNC: 44.6 MG/DL
MICROALBUMIN UR-MCNC: 1.8 MG/DL
MICROALBUMIN/CREAT UR: 40 MG/G (ref 0–30)
T4 FREE SERPL-MCNC: 0.94 NG/DL (ref 0.53–1.43)
TSH SERPL DL<=0.005 MIU/L-ACNC: 1.81 UIU/ML (ref 0.38–5.33)

## 2019-10-28 PROCEDURE — 36415 COLL VENOUS BLD VENIPUNCTURE: CPT

## 2019-10-28 PROCEDURE — 83036 HEMOGLOBIN GLYCOSYLATED A1C: CPT

## 2019-10-28 PROCEDURE — 82043 UR ALBUMIN QUANTITATIVE: CPT

## 2019-10-28 PROCEDURE — 82570 ASSAY OF URINE CREATININE: CPT

## 2019-10-28 PROCEDURE — 84443 ASSAY THYROID STIM HORMONE: CPT

## 2019-10-28 PROCEDURE — 82985 ASSAY OF GLYCATED PROTEIN: CPT

## 2019-10-28 PROCEDURE — 84439 ASSAY OF FREE THYROXINE: CPT

## 2019-10-29 LAB
EST. AVERAGE GLUCOSE BLD GHB EST-MCNC: 174 MG/DL
HBA1C MFR BLD: 7.7 % (ref 0–5.6)

## 2019-10-30 LAB — FRUCTOSAMINE SERPL-SCNC: 281 UMOL/L (ref 170–285)

## 2019-11-08 ENCOUNTER — OFFICE VISIT (OUTPATIENT)
Dept: MEDICAL GROUP | Facility: MEDICAL CENTER | Age: 59
End: 2019-11-08
Payer: COMMERCIAL

## 2019-11-08 VITALS
SYSTOLIC BLOOD PRESSURE: 120 MMHG | HEART RATE: 78 BPM | WEIGHT: 239.86 LBS | RESPIRATION RATE: 14 BRPM | DIASTOLIC BLOOD PRESSURE: 80 MMHG | BODY MASS INDEX: 33.58 KG/M2 | HEIGHT: 71 IN | OXYGEN SATURATION: 98 % | TEMPERATURE: 98.2 F

## 2019-11-08 DIAGNOSIS — Z11.59 NEED FOR HEPATITIS C SCREENING TEST: ICD-10-CM

## 2019-11-08 DIAGNOSIS — K21.00 GASTROESOPHAGEAL REFLUX DISEASE WITH ESOPHAGITIS: ICD-10-CM

## 2019-11-08 DIAGNOSIS — Z12.11 SCREENING FOR COLORECTAL CANCER: ICD-10-CM

## 2019-11-08 DIAGNOSIS — Z23 NEED FOR VACCINATION: ICD-10-CM

## 2019-11-08 DIAGNOSIS — E11.29 TYPE 2 DIABETES MELLITUS WITH MICROALBUMINURIA, WITH LONG-TERM CURRENT USE OF INSULIN (HCC): ICD-10-CM

## 2019-11-08 DIAGNOSIS — Z79.4 TYPE 2 DIABETES MELLITUS WITH BOTH EYES AFFECTED BY RETINOPATHY WITHOUT MACULAR EDEMA, WITH LONG-TERM CURRENT USE OF INSULIN, UNSPECIFIED RETINOPATHY SEVERITY (HCC): ICD-10-CM

## 2019-11-08 DIAGNOSIS — E66.9 OBESITY (BMI 30-39.9): ICD-10-CM

## 2019-11-08 DIAGNOSIS — Z12.12 SCREENING FOR COLORECTAL CANCER: ICD-10-CM

## 2019-11-08 DIAGNOSIS — E11.319 TYPE 2 DIABETES MELLITUS WITH BOTH EYES AFFECTED BY RETINOPATHY WITHOUT MACULAR EDEMA, WITH LONG-TERM CURRENT USE OF INSULIN, UNSPECIFIED RETINOPATHY SEVERITY (HCC): ICD-10-CM

## 2019-11-08 DIAGNOSIS — Z79.4 TYPE 2 DIABETES MELLITUS WITH MICROALBUMINURIA, WITH LONG-TERM CURRENT USE OF INSULIN (HCC): ICD-10-CM

## 2019-11-08 DIAGNOSIS — I10 ESSENTIAL HYPERTENSION: ICD-10-CM

## 2019-11-08 DIAGNOSIS — R80.9 TYPE 2 DIABETES MELLITUS WITH MICROALBUMINURIA, WITH LONG-TERM CURRENT USE OF INSULIN (HCC): ICD-10-CM

## 2019-11-08 PROCEDURE — 99214 OFFICE O/P EST MOD 30 MIN: CPT | Mod: 25 | Performed by: FAMILY MEDICINE

## 2019-11-08 PROCEDURE — 90471 IMMUNIZATION ADMIN: CPT | Performed by: FAMILY MEDICINE

## 2019-11-08 PROCEDURE — 90686 IIV4 VACC NO PRSV 0.5 ML IM: CPT | Performed by: FAMILY MEDICINE

## 2019-11-08 NOTE — ASSESSMENT & PLAN NOTE
"This is a chronic health problem for this patient for which we started him on omeprazole 20 mg daily to try and help with his \"heartburn\".  He has seen improvement in his heartburn with taking the medication but he continues to not be able to eat even mildly spicy foods which is a loss to him.  He previously enjoyed heavily spicy foods.  He also can still get episodes where he has bilious type burping they will even be so strong he will have to excuse himself from being out in public.  I think we need to know whether or not this gentleman is dealing with a hiatal hernia.  I would like to get an upper GI and then see him back to go over results.  "

## 2019-11-08 NOTE — ASSESSMENT & PLAN NOTE
This is a chronic health problem for this patient where he sees Dr. Blanc's office for his diabetes treatment.  He did have a diabetic foot exam was told he had no areas of numbness.  He does have diabetic retinopathy and has a lesion in the right eye that is going to have to be seen by the retinal specialist and have injections.  His A1c is come down from 8.4-7.7 as of 10/28/2019.  He has not lost as much weight as I would like to see him do.  I am having him contact Dr. Blanc's office to discuss the possibility of going off of Trulicity and onto Ozempic for which we have seen improvement in weight loss which will just help everything he is going through.

## 2019-11-08 NOTE — ASSESSMENT & PLAN NOTE
This is a chronic health problem for this patient for which she is getting more serious about eating better and taking better control of his diabetes.  We talked about the fact that he may do better with weight loss on Ozempic in place of Trulicity.  He is going to discuss that option with his diabetic provider through Dr. Blanc's office.

## 2019-11-08 NOTE — ASSESSMENT & PLAN NOTE
This is a chronic health problem for this patient well-controlled with current medication.  Blood pressure today 120/80.  Patient's weight is actually up 4 pounds from the last time I saw him in August he is at 239 with a BMI of 33.45.

## 2019-11-08 NOTE — LETTER
[facility]  Liza Andrew M.D.  [address]  [place] [address]  Fax: [phone]   Authorization for Release/Disclosure of   Protected Health Information   Name: PAULINA ESPINAL : 1960 SSN: [id number]   Address: [address] [place] [address]  [place] [address] Phone:    [phone] (home) [phone] (work)   I authorize the entity listed below to release/disclose the PHI below to:   [facility]/Liza Andrew M.D. and Liza Andrew M.D.   Provider or Entity Name:     Address   City, State, Zip   Phone:      Fax:     Reason for request: continuity of care   Information to be released:    [  ] LAST COLONOSCOPY,  including any PATH REPORT and follow-up  [  ] LAST FIT/COLOGUARD RESULT [  ] LAST DEXA  [  ] LAST MAMMOGRAM  [  ] LAST PAP  [  ] LAST LABS [ X ] RETINA EXAM REPORT  [  ] IMMUNIZATION RECORDS  [  ] Release all info      [  ] Check here and initial the line next to each item to release ALL health information INCLUDING  _____ Care and treatment for drug and / or alcohol abuse  _____ HIV testing, infection status, or AIDS  _____ Genetic Testing    DATES OF SERVICE OR TIME PERIOD TO BE DISCLOSED: _____________  I understand and acknowledge that:  * This Authorization may be revoked at any time by you in writing, except if your health information has already been used or disclosed.  * Your health information that will be used or disclosed as a result of you signing this authorization could be re-disclosed by the recipient. If this occurs, your re-disclosed health information may no longer be protected by State or Federal laws.  * You may refuse to sign this Authorization. Your refusal will not affect your ability to obtain treatment.  * This Authorization becomes effective upon signing and will  on (date) __________.      If no date is indicated, this Authorization will  one (1) year from the signature date.    Name: Paulina Espinal    Signature:   Date:      11/8/2019       PLEASE FAX REQUESTED RECORDS BACK TO: [phone]

## 2019-11-14 PROBLEM — Z79.4 TYPE 2 DIABETES MELLITUS WITH MICROALBUMINURIA, WITH LONG-TERM CURRENT USE OF INSULIN (HCC): Status: ACTIVE | Noted: 2019-11-14

## 2019-11-14 PROBLEM — E11.29 TYPE 2 DIABETES MELLITUS WITH MICROALBUMINURIA, WITH LONG-TERM CURRENT USE OF INSULIN (HCC): Status: ACTIVE | Noted: 2019-11-14

## 2019-11-14 PROBLEM — R80.9 TYPE 2 DIABETES MELLITUS WITH MICROALBUMINURIA, WITH LONG-TERM CURRENT USE OF INSULIN (HCC): Status: ACTIVE | Noted: 2019-11-14

## 2019-11-14 NOTE — ASSESSMENT & PLAN NOTE
This is a chronic health problem for this patient that we went ahead and did his microalbumin/creatinine ratio and it comes back elevated at 40.  Patient has been working on trying to get his diabetes under better control and is done a great job.  He is dropped his A1c down from 8.4-7.7.  He will continue in his efforts to get this under control.  We will continue to monitor.

## 2019-11-14 NOTE — PROGRESS NOTES
"CC:Diagnoses of Need for hepatitis C screening test, Need for vaccination, Screening for colorectal cancer, Gastroesophageal reflux disease with esophagitis, Essential hypertension, Obesity (BMI 30-39.9), Type 2 diabetes mellitus with both eyes affected by retinopathy without macular edema, with long-term current use of insulin, unspecified retinopathy severity (HCC), and Type 2 diabetes mellitus with microalbuminuria, with long-term current use of insulin (HCC) were pertinent to this visit.    HISTORY OF PRESENT ILLNESS: Patient is a 59 y.o. male established patient who presents today to talk with this patient about his chronic health problems as outlined below.  He has several concerns that need to be addressed.  Blood work was done on 10/28/2019 and reviewed today.      Gastroesophageal reflux disease with esophagitis  This is a chronic health problem for this patient for which we started him on omeprazole 20 mg daily to try and help with his \"heartburn\".  He has seen improvement in his heartburn with taking the medication but he continues to not be able to eat even mildly spicy foods which is a loss to him.  He previously enjoyed heavily spicy foods.  He also can still get episodes where he has bilious type burping they will even be so strong he will have to excuse himself from being out in public.  I think we need to know whether or not this gentleman is dealing with a hiatal hernia.  I would like to get an upper GI and then see him back to go over results.    HTN (hypertension)  This is a chronic health problem for this patient well-controlled with current medication.  Blood pressure today 120/80.  Patient's weight is actually up 4 pounds from the last time I saw him in August he is at 239 with a BMI of 33.45.    Obesity (BMI 30-39.9)  This is a chronic health problem for this patient for which she is getting more serious about eating better and taking better control of his diabetes.  We talked about the fact " that he may do better with weight loss on Ozempic in place of Trulicity.  He is going to discuss that option with his diabetic provider through Dr. Blanc's office.    Type 2 diabetes mellitus with retinopathy, with long-term current use of insulin ([facility])  This is a chronic health problem for this patient where he sees Dr. Blanc's office for his diabetes treatment.  He did have a diabetic foot exam was told he had no areas of numbness.  He does have diabetic retinopathy and has a lesion in the right eye that is going to have to be seen by the retinal specialist and have injections.  His A1c is come down from 8.4-7.7 as of 10/28/2019.  He has not lost as much weight as I would like to see him do.  I am having him contact Dr. Blanc's office to discuss the possibility of going off of Trulicity and onto Ozempic for which we have seen improvement in weight loss which will just help everything he is going through.    Type 2 diabetes mellitus with microalbuminuria, with long-term current use of insulin ([facility])  This is a chronic health problem for this patient that we went ahead and did his microalbumin/creatinine ratio and it comes back elevated at 40.  Patient has been working on trying to get his diabetes under better control and is done a great job.  He is dropped his A1c down from 8.4-7.7.  He will continue in his efforts to get this under control.  We will continue to monitor.      Patient Active Problem List    Diagnosis Date Noted   • Type 2 diabetes mellitus with microalbuminuria, with long-term current use of insulin ([facility]) 11/14/2019   • Mixed hyperlipidemia 07/25/2019   • Gastroesophageal reflux disease with esophagitis 07/25/2019   • Chronic pain of both shoulders 07/25/2019   • Obesity (BMI 30-39.9) 07/25/2019   • Type 2 diabetes mellitus with retinopathy, with long-term current use of insulin ([facility]) 01/08/2013   • HTN (hypertension) 01/08/2013      Allergies:Nkda [no known drug allergy]    Current Outpatient  Medications   Medication Sig Dispense Refill   • omeprazole (PRILOSEC) 20 MG delayed-release capsule TAKE ONE CAPSULE BY MOUTH DAILY 90 Cap 3   • Dulaglutide (TRULICITY) 0.75 MG/0.5ML Solution Pen-injector Inject  as instructed.     • NOVOLOG, insulin aspart, (NOVOLOG FLEXPEN) 100 UNIT/ML Solution Pen-injector injection Inject 10 Units as instructed every day.     • Insulin Glargine (TOUJEO MAX SOLOSTAR) 300 UNIT/ML Solution Pen-injector Inject 64 Units as instructed every day.     • carvedilol (COREG) 6.25 MG Tab Take 6.25 mg by mouth 2 times a day, with meals.     • atorvastatin (LIPITOR) 40 MG Tab Take 40 mg by mouth every evening.     • Dapagliflozin-Metformin HCl ER (XIGDUO XR) 5-1000 MG TABLET SR 24 HR Take  by mouth.     • Albiglutide (TANZEUM) 50 MG Pen-injector Inject  as instructed.     • losartan (COZAAR) 50 MG TABS Take 50 mg by mouth every day.         No current facility-administered medications for this visit.        Social History     Tobacco Use   • Smoking status: Never Smoker   • Smokeless tobacco: Never Used   Substance Use Topics   • Alcohol use: No     Comment: social/occasional   • Drug use: No     Social History     Patient does not qualify to have social determinant information on file (likely too young).   Social History Narrative   • Not on file       Family History   Problem Relation Age of Onset   • Diabetes Mother    • Hypertension Mother    • Hyperlipidemia Mother    • Diabetes Father    • Hypertension Father    • Hyperlipidemia Father    • Cancer Sister         breast        ROS:     - Constitutional:  Negative for fever, chills, unexpected weight change, and fatigue/generalized weakness.    - HEENT:  Negative for headaches, vision changes, hearing changes, ear pain, ear discharge, rhinorrhea, sinus congestion, sore throat, and neck pain.      - Respiratory: Negative for cough, sputum production, chest congestion, dyspnea, wheezing, and crackles.      - Cardiovascular: Negative for  "chest pain, palpitations, orthopnea, and bilateral lower extremity edema.     - Gastrointestinal: Still having some heartburn and stomach upset without nausea vomiting or abdominal pain. .     - Genitourinary: Negative for dysuria, polyuria, hematuria, pyuria, urinary urgency, and urinary incontinence.     - Musculoskeletal: Negative for myalgias, back pain, and joint pain.     - Skin: Negative for rash, itching, cyanotic skin color change.     - Neurological: Negative for dizziness, tingling, tremors, focal sensory deficit, focal weakness and headaches.     - Endo/Heme/Allergies: Does not bruise/bleed easily.     - Psychiatric/Behavioral: Negative for depression, suicidal/homicidal ideation and memory loss.          - NOTE: All other systems reviewed and are negative, except as in HPI.      Exam:    /80 (BP Location: Left arm, Patient Position: Sitting, BP Cuff Size: Adult)   Pulse 78   Temp 36.8 °C (98.2 °F) (Temporal)   Resp 14   Ht 1.803 m (5' 11\")   Wt 108.8 kg (239 lb 13.8 oz)   SpO2 98%  Body mass index is 33.45 kg/m².    General:  Well nourished, well developed male in NAD  Head is grossly normal.  Neck: Supple without JVD or bruit. Thyroid is not enlarged.  Pulmonary: Clear to ausculation and percussion.  Normal effort. No rales, ronchi, or wheezing.  Cardiovascular: Regular rate and rhythm without murmur. Carotid and radial pulses are intact and equal bilaterally.  Extremities: no clubbing, cyanosis, or edema.  LABS: 10/28/2019: Results reviewed and discussed with the patient, questions answered.    Please note that this dictation was created using voice recognition software. I have made every reasonable attempt to correct obvious errors, but I expect that there are errors of grammar and possibly content that I did not discover before finalizing the note.    Assessment/Plan:  1. Need for hepatitis C screening test  Patient will do hep C screening with next labs  - HEP C VIRUS ANTIBODY; " Future    2. Need for vaccination  Given today  - Influenza Vaccine Quad Injection (PF)    3. Screening for colorectal cancer  Patient will do colon cancer screening    4. Gastroesophageal reflux disease with esophagitis  Uncontrolled, we will get an upper GI with [facility] and see if this patient has a hiatal hernia.  If he does we need to consider referral.  - DX-UPPER GI-SERIES WITH [facility]; Future    5. Essential hypertension  Controlled, continue with current meds and lifestyle.      6. Obesity (BMI 30-39.9)  Uncontrolled, patient's renewing his efforts to get this under better control    7. Type 2 diabetes mellitus with both eyes affected by retinopathy without macular edema, with long-term current use of insulin, unspecified retinopathy severity (HCC)  Uncontrolled but improved with changes in meds and lifestyle  - Diabetic Monofilament LE Exam    8. Type 2 diabetes mellitus with microalbuminuria, with long-term current use of insulin (HCC)  Uncontrolled but improved.  We will plan to follow this annually.

## 2019-12-13 ENCOUNTER — APPOINTMENT (OUTPATIENT)
Dept: MEDICAL GROUP | Facility: MEDICAL CENTER | Age: 59
End: 2019-12-13
Payer: COMMERCIAL

## 2020-01-24 ENCOUNTER — OFFICE VISIT (OUTPATIENT)
Dept: MEDICAL GROUP | Facility: MEDICAL CENTER | Age: 60
End: 2020-01-24
Payer: COMMERCIAL

## 2020-01-24 VITALS
HEIGHT: 71 IN | SYSTOLIC BLOOD PRESSURE: 122 MMHG | WEIGHT: 227.29 LBS | DIASTOLIC BLOOD PRESSURE: 76 MMHG | OXYGEN SATURATION: 98 % | TEMPERATURE: 98.2 F | HEART RATE: 72 BPM | RESPIRATION RATE: 12 BRPM | BODY MASS INDEX: 31.82 KG/M2

## 2020-01-24 DIAGNOSIS — E11.319 TYPE 2 DIABETES MELLITUS WITH BOTH EYES AFFECTED BY RETINOPATHY WITHOUT MACULAR EDEMA, WITH LONG-TERM CURRENT USE OF INSULIN, UNSPECIFIED RETINOPATHY SEVERITY (HCC): ICD-10-CM

## 2020-01-24 DIAGNOSIS — I10 ESSENTIAL HYPERTENSION: ICD-10-CM

## 2020-01-24 DIAGNOSIS — J06.9 VIRAL UPPER RESPIRATORY TRACT INFECTION: ICD-10-CM

## 2020-01-24 DIAGNOSIS — Z79.4 TYPE 2 DIABETES MELLITUS WITH BOTH EYES AFFECTED BY RETINOPATHY WITHOUT MACULAR EDEMA, WITH LONG-TERM CURRENT USE OF INSULIN, UNSPECIFIED RETINOPATHY SEVERITY (HCC): ICD-10-CM

## 2020-01-24 LAB
FLUAV+FLUBV AG SPEC QL IA: NEGATIVE
INT CON NEG: NEGATIVE
INT CON NEG: NEGATIVE
INT CON POS: POSITIVE
INT CON POS: POSITIVE
S PYO AG THROAT QL: NEGATIVE

## 2020-01-24 PROCEDURE — 87804 INFLUENZA ASSAY W/OPTIC: CPT | Performed by: FAMILY MEDICINE

## 2020-01-24 PROCEDURE — 99214 OFFICE O/P EST MOD 30 MIN: CPT | Performed by: FAMILY MEDICINE

## 2020-01-24 PROCEDURE — 87880 STREP A ASSAY W/OPTIC: CPT | Performed by: FAMILY MEDICINE

## 2020-01-24 ASSESSMENT — PATIENT HEALTH QUESTIONNAIRE - PHQ9: CLINICAL INTERPRETATION OF PHQ2 SCORE: 0

## 2020-01-24 NOTE — LETTER
January 24, 2020       Patient: Kenney Espinal   YOB: 1960   Date of Visit: 1/24/2020         To Whom It May Concern:    It is my medical opinion that Kenney Espinal remain out of work until  1/27/2020.    If you have any questions or concerns, please don't hesitate to call [phone]          Sincerely,          Liza Andrew M.D.

## 2020-01-24 NOTE — ASSESSMENT & PLAN NOTE
This is a new problem for this patient's been going on approximately 72 hours.  He has felt fatigued and felt warm but has not had shaking chills.  He initially started with a laryngitis type problem where his voice was changed which has persisted today.  He also has a cough that is nonproductive.  He has pain intermittently over the maxillary sinuses bilaterally but his teeth do not hurt.  He did have some headache which was much worse yesterday than it is today.  He has been using Tylenol Cold and sinus with some relief and has been staying in bed.  We did do a strep screen and a flu screen and both of those are negative.  I recommended that the patient stay off from work for the next 2 days and have written him a note for his employer.

## 2020-01-24 NOTE — ASSESSMENT & PLAN NOTE
This is a chronic health problem that is uncontrolled with current medications and lifestyle measures.  This patient continues to follow with Dr. Laura Blanc and had contacted her that he was having difficulty getting Toujeo as well as his Victoza.  Evidently his pharmacy provider through his insurance had changed and he just was unable to get the medications everything was too expensive.  He was finally able to get into her and get some samples.  His blood sugars were up in the 200s but they are now working their way down and he said his sugar yesterday was 137.  We will just continue to follow along with Dr. Blanc.

## 2020-01-24 NOTE — PROGRESS NOTES
CC:Diagnoses of Viral upper respiratory tract infection, Type 2 diabetes mellitus with both eyes affected by retinopathy without macular edema, with long-term current use of insulin, unspecified retinopathy severity (HCC), and Essential hypertension were pertinent to this visit.    HISTORY OF PRESENT ILLNESS: Patient is a 59 y.o. male established patient who presents today to complain that he has had an upper respiratory infection ongoing over 72 hours and it has put him at home in bed for the last 2 days.  He is concerned that he is going into the weekend feeling so poorly.      Viral upper respiratory tract infection  This is a new problem for this patient's been going on approximately 72 hours.  He has felt fatigued and felt warm but has not had shaking chills.  He initially started with a laryngitis type problem where his voice was changed which has persisted today.  He also has a cough that is nonproductive.  He has pain intermittently over the maxillary sinuses bilaterally but his teeth do not hurt.  He did have some headache which was much worse yesterday than it is today.  He has been using Tylenol Cold and sinus with some relief and has been staying in bed.  We did do a strep screen and a flu screen and both of those are negative.  I recommended that the patient stay off from work for the next 2 days and have written him a note for his employer.    Type 2 diabetes mellitus with retinopathy, with long-term current use of insulin (HCC)  This is a chronic health problem that is uncontrolled with current medications and lifestyle measures.  This patient continues to follow with Dr. Laura Blanc and had contacted her that he was having difficulty getting Toujeo as well as his Victoza.  Evidently his pharmacy provider through his insurance had changed and he just was unable to get the medications everything was too expensive.  He was finally able to get into her and get some samples.  His blood sugars were up in  the 200s but they are now working their way down and he said his sugar yesterday was 137.  We will just continue to follow along with Dr. Blanc.    HTN (hypertension)  This is a chronic health problem that is uncontrolled with current medications and lifestyle measures.  His BP is excellent 122/76.        Patient Active Problem List    Diagnosis Date Noted   • Viral upper respiratory tract infection 01/24/2020   • Type 2 diabetes mellitus with microalbuminuria, with long-term current use of insulin ([facility]) 11/14/2019   • Mixed hyperlipidemia 07/25/2019   • Gastroesophageal reflux disease with esophagitis 07/25/2019   • Chronic pain of both shoulders 07/25/2019   • Obesity (BMI 30-39.9) 07/25/2019   • Type 2 diabetes mellitus with retinopathy, with long-term current use of insulin ([facility]) 01/08/2013   • HTN (hypertension) 01/08/2013      Allergies:Nkda [no known drug allergy]    Current Outpatient Medications   Medication Sig Dispense Refill   • omeprazole (PRILOSEC) 20 MG delayed-release capsule TAKE ONE CAPSULE BY MOUTH DAILY 90 Cap 3   • Dulaglutide (TRULICITY) 0.75 MG/0.5ML Solution Pen-injector Inject  as instructed.     • NOVOLOG, insulin aspart, (NOVOLOG FLEXPEN) 100 UNIT/ML Solution Pen-injector injection Inject 10 Units as instructed every day.     • Insulin Glargine (TOUJEO MAX SOLOSTAR) 300 UNIT/ML Solution Pen-injector Inject 64 Units as instructed every day.     • carvedilol (COREG) 6.25 MG Tab Take 6.25 mg by mouth 2 times a day, with meals.     • atorvastatin (LIPITOR) 40 MG Tab Take 40 mg by mouth every evening.     • Dapagliflozin-Metformin HCl ER (XIGDUO XR) 5-1000 MG TABLET SR 24 HR Take  by mouth.     • Albiglutide (TANZEUM) 50 MG Pen-injector Inject  as instructed.     • losartan (COZAAR) 50 MG TABS Take 50 mg by mouth every day.         No current facility-administered medications for this visit.        Social History     Tobacco Use   • Smoking status: Never Smoker   • Smokeless tobacco: Never  "Used   Substance Use Topics   • Alcohol use: No     Comment: social/occasional   • Drug use: No     Social History     Patient does not qualify to have social determinant information on file (likely too young).   Social History Narrative   • Not on file       Family History   Problem Relation Age of Onset   • Diabetes Mother    • Hypertension Mother    • Hyperlipidemia Mother    • Diabetes Father    • Hypertension Father    • Hyperlipidemia Father    • Cancer Sister         breast        ROS:     - Constitutional: Positive for fatigue and malaise for 72 hours and may be fever but no chills.  Negative for fever, chills, unexpected weight change, and fatigue/generalized weakness.    - HEENT: Positive for clear rhinorrhea, occasional headache over the maxillary sinuses but that is lessening, nasal congestion and sore throat.       - Respiratory: Positive for nonproductive cough.      - Cardiovascular: Negative for chest pain, palpitations, orthopnea, and bilateral lower extremity edema.     - Gastrointestinal: Negative for heartburn, nausea, vomiting, abdominal pain, hematochezia, melena, diarrhea, constipation, and greasy/foul-smelling stools.     - Genitourinary: Negative for dysuria, polyuria, hematuria, pyuria, urinary urgency, and urinary incontinence.     - Musculoskeletal: Negative for myalgias, back pain, and joint pain.     - Skin: Negative for rash, itching, cyanotic skin color change.     - Neurological: Negative for dizziness, tingling, tremors, focal sensory deficit, focal weakness and headaches.     - Endo/Heme/Allergies: Does not bruise/bleed easily.     - Psychiatric/Behavioral: Negative for depression, suicidal/homicidal ideation and memory loss.          - NOTE: All other systems reviewed and are negative, except as in HPI.      Exam:    /76 (BP Location: Left arm, Patient Position: Sitting, BP Cuff Size: Adult)   Pulse 72   Temp 36.8 °C (98.2 °F) (Temporal)   Resp 12   Ht 1.803 m (5' 11\")  "  Wt 103.1 kg (227 lb 4.7 oz)   SpO2 98%  Body mass index is 31.7 kg/m².    General:  Well nourished, well developed male in NAD  HEENT: Eyes conjunctiva is clear, lids without ptosis, pupils equal round and reactive to light and accommodation.  Ears normal shape and contour, canals are clear bilaterally, TMs with good light reflex and appear normal.  Nasal mucosa erythematous and edematous with clear to opaque rhinorrhea.  Oropharynx benign.  Sinuses (frontal and maxillary) nontender to palpation.  Head is grossly normal.  Neck: Supple without JVD or bruit. Thyroid is not enlarged.  Pulmonary: Clear to ausculation and percussion.  Normal effort. No rales, ronchi, or wheezing.  Cardiovascular: Regular rate and rhythm without murmur. Carotid and radial pulses are intact and equal bilaterally.    Strep: Negative, influenza: Negative  Please note that this dictation was created using voice recognition software. I have made every reasonable attempt to correct obvious errors, but I expect that there are errors of grammar and possibly content that I did not discover before finalizing the note.    Assessment/Plan:  1. Viral upper respiratory tract infection  Patient just has a viral URI I have asked him to use over-the-counter symptomatic medication.  I have asked him to stay home for 2 more days with rest and gave him a note for work.  - POCT Influenza A/B  - POCT Rapid Strep A    2. Type 2 diabetes mellitus with both eyes affected by retinopathy without macular edema, with long-term current use of insulin, unspecified retinopathy severity (HCC)  Patient has seen his blood sugars go up but they are now getting better with him getting a hold of his medications.    3. Essential hypertension  Controlled, continue with current meds and lifestyle.

## 2020-01-24 NOTE — ASSESSMENT & PLAN NOTE
This is a chronic health problem that is uncontrolled with current medications and lifestyle measures.  His BP is excellent 122/76.

## 2020-01-30 ENCOUNTER — PATIENT MESSAGE (OUTPATIENT)
Dept: MEDICAL GROUP | Facility: MEDICAL CENTER | Age: 60
End: 2020-01-30

## 2020-01-30 NOTE — TELEPHONE ENCOUNTER
From: Kenney Espinal  To: Liza Andrew M.D.  Sent: 1/30/2020 7:31 AM PST  Subject: Prescription Question    Dr. Andrew,    Could you possibly prescribe a cough syrup for me for nighttime. I am coughing so much at night that I can't sleep. What I'm taking doesn't help me at night. Thank you.

## 2020-11-18 ENCOUNTER — HOSPITAL ENCOUNTER (OUTPATIENT)
Dept: LAB | Facility: MEDICAL CENTER | Age: 60
End: 2020-11-18
Attending: PHYSICIAN ASSISTANT
Payer: COMMERCIAL

## 2020-11-18 LAB
ALBUMIN SERPL BCP-MCNC: 4.1 G/DL (ref 3.2–4.9)
ALBUMIN/GLOB SERPL: 1.5 G/DL
ALP SERPL-CCNC: 74 U/L (ref 30–99)
ALT SERPL-CCNC: 16 U/L (ref 2–50)
ANION GAP SERPL CALC-SCNC: 9 MMOL/L (ref 7–16)
AST SERPL-CCNC: 16 U/L (ref 12–45)
BILIRUB SERPL-MCNC: 0.3 MG/DL (ref 0.1–1.5)
BUN SERPL-MCNC: 20 MG/DL (ref 8–22)
CALCIUM SERPL-MCNC: 9.4 MG/DL (ref 8.5–10.5)
CHLORIDE SERPL-SCNC: 103 MMOL/L (ref 96–112)
CO2 SERPL-SCNC: 28 MMOL/L (ref 20–33)
CREAT SERPL-MCNC: 0.78 MG/DL (ref 0.5–1.4)
EST. AVERAGE GLUCOSE BLD GHB EST-MCNC: 249 MG/DL
FASTING STATUS PATIENT QL REPORTED: NORMAL
GLOBULIN SER CALC-MCNC: 2.7 G/DL (ref 1.9–3.5)
GLUCOSE SERPL-MCNC: 168 MG/DL (ref 65–99)
HBA1C MFR BLD: 10.3 % (ref 0–5.6)
POTASSIUM SERPL-SCNC: 4.1 MMOL/L (ref 3.6–5.5)
PROT SERPL-MCNC: 6.8 G/DL (ref 6–8.2)
SODIUM SERPL-SCNC: 140 MMOL/L (ref 135–145)

## 2020-11-18 PROCEDURE — 36415 COLL VENOUS BLD VENIPUNCTURE: CPT

## 2020-11-18 PROCEDURE — 83704 LIPOPROTEIN BLD QUAN PART: CPT

## 2020-11-18 PROCEDURE — 80053 COMPREHEN METABOLIC PANEL: CPT

## 2020-11-18 PROCEDURE — 80061 LIPID PANEL: CPT

## 2020-11-18 PROCEDURE — 83036 HEMOGLOBIN GLYCOSYLATED A1C: CPT

## 2020-11-19 ENCOUNTER — OFFICE VISIT (OUTPATIENT)
Dept: URGENT CARE | Facility: CLINIC | Age: 60
End: 2020-11-19
Payer: COMMERCIAL

## 2020-11-19 ENCOUNTER — PATIENT MESSAGE (OUTPATIENT)
Dept: MEDICAL GROUP | Facility: PHYSICIAN GROUP | Age: 60
End: 2020-11-19

## 2020-11-19 VITALS
HEART RATE: 68 BPM | BODY MASS INDEX: 32.48 KG/M2 | WEIGHT: 232 LBS | SYSTOLIC BLOOD PRESSURE: 134 MMHG | TEMPERATURE: 98 F | HEIGHT: 71 IN | RESPIRATION RATE: 16 BRPM | OXYGEN SATURATION: 97 % | DIASTOLIC BLOOD PRESSURE: 86 MMHG

## 2020-11-19 DIAGNOSIS — L30.9 DERMATITIS: ICD-10-CM

## 2020-11-19 DIAGNOSIS — R21 RASH AND NONSPECIFIC SKIN ERUPTION: ICD-10-CM

## 2020-11-19 DIAGNOSIS — L29.9 PRURITUS: ICD-10-CM

## 2020-11-19 PROCEDURE — 99214 OFFICE O/P EST MOD 30 MIN: CPT | Performed by: NURSE PRACTITIONER

## 2020-11-19 RX ORDER — TRIAMCINOLONE ACETONIDE 1 MG/G
1 CREAM TOPICAL 2 TIMES DAILY
Qty: 45 G | Refills: 0 | Status: SHIPPED | OUTPATIENT
Start: 2020-11-19 | End: 2020-12-10 | Stop reason: SDUPTHER

## 2020-11-19 RX ORDER — HYDROXYZINE HYDROCHLORIDE 25 MG/1
25 TABLET, FILM COATED ORAL EVERY 6 HOURS PRN
Qty: 30 TAB | Refills: 0 | Status: SHIPPED | OUTPATIENT
Start: 2020-11-19

## 2020-11-19 ASSESSMENT — VISUAL ACUITY: OU: 1

## 2020-11-20 NOTE — PROGRESS NOTES
Subjective:     Kenney Espinal is a 60 y.o. male who presents for Rash (x1 week, itchy left side of tight area, abdomen area )       Rash  This is a new problem. The problem has been gradually worsening since onset. Treatments tried: Psoriasin. The treatment provided no relief. His past medical history is significant for eczema.     Patient reports about 1 week ago, he started to notice itchiness and redness at his left thigh. Also has a similar symptoms his left lower abdominal wall. Reports some burning, but denies pain. The rash is flat and there are no blisters. Denies fever or chills. Reports no known allergens. Reports using a new Dove bar soap. Reports recently increased stress.    Patient was screened prior to rooming and denied COVID-19 diagnosis or contact with a person who has been diagnosed or is suspected to have COVID-19. During this visit, appropriate PPE was worn, hand hygiene was performed, and the patient and any visitors were masked.     PMH:  has a past medical history of Chronic pain of both shoulders (7/25/2019), Diabetes, DIABETES MELLITUS (1/8/2013), Gastroesophageal reflux disease with esophagitis (7/25/2019), HTN (hypertension) (1/8/2013), Mixed hyperlipidemia (7/25/2019), and Other chest pain (1/8/2013).    MEDS:   Current Outpatient Medications:   •  triamcinolone acetonide (KENALOG) 0.1 % Cream, Apply 1 Application topically 2 times a day for 7 days., Disp: 45 g, Rfl: 0  •  hydrOXYzine HCl (ATARAX) 25 MG Tab, Take 1 Tab by mouth every 6 hours as needed for Itching. Caution drowsiness., Disp: 30 Tab, Rfl: 0  •  omeprazole (PRILOSEC) 20 MG delayed-release capsule, TAKE ONE CAPSULE BY MOUTH DAILY, Disp: 90 Cap, Rfl: 3  •  Dulaglutide (TRULICITY) 0.75 MG/0.5ML Solution Pen-injector, Inject  as instructed., Disp: , Rfl:   •  NOVOLOG, insulin aspart, (NOVOLOG FLEXPEN) 100 UNIT/ML Solution Pen-injector injection, Inject 10 Units as instructed every day., Disp: , Rfl:   •  Insulin Glargine  "(TOUJEO MAX SOLOSTAR) 300 UNIT/ML Solution Pen-injector, Inject 64 Units as instructed every day., Disp: , Rfl:   •  carvedilol (COREG) 6.25 MG Tab, Take 6.25 mg by mouth 2 times a day, with meals., Disp: , Rfl:   •  atorvastatin (LIPITOR) 40 MG Tab, Take 40 mg by mouth every evening., Disp: , Rfl:   •  Dapagliflozin-Metformin HCl ER (XIGDUO XR) 5-1000 MG TABLET SR 24 HR, Take  by mouth., Disp: , Rfl:   •  losartan (COZAAR) 50 MG TABS, Take 50 mg by mouth every day.  , Disp: , Rfl:   •  Albiglutide (TANZEUM) 50 MG Pen-injector, Inject  as instructed., Disp: , Rfl:     ALLERGIES:   Allergies   Allergen Reactions   • Nkda [No Known Drug Allergy]      SURGHX:   Past Surgical History:   Procedure Laterality Date   • OTHER ORTHOPEDIC SURGERY      L knee   • TONSILLECTOMY       SOCHX:  reports that he has never smoked. He has never used smokeless tobacco. He reports that he does not drink alcohol or use drugs.     FH: Reviewed with patient, not pertinent to this visit.    Review of Systems   Skin: Positive for rash.     Additional details per HPI.      Objective:     /86   Pulse 68   Temp 36.7 °C (98 °F) (Temporal)   Resp 16   Ht 1.803 m (5' 11\")   Wt 105.2 kg (232 lb)   SpO2 97%   BMI 32.36 kg/m²     Physical Exam  Vitals signs reviewed.   Constitutional:       General: He is not in acute distress.     Appearance: He is well-developed. He is not ill-appearing or toxic-appearing.   HENT:      Head: Normocephalic.      Right Ear: External ear normal.      Left Ear: External ear normal.      Nose: Nose normal.   Eyes:      General: Vision grossly intact.      Extraocular Movements: Extraocular movements intact.      Conjunctiva/sclera: Conjunctivae normal.   Neck:      Musculoskeletal: Normal range of motion.   Cardiovascular:      Rate and Rhythm: Normal rate.   Pulmonary:      Effort: Pulmonary effort is normal. No respiratory distress.   Musculoskeletal: Normal range of motion.         General: No deformity. "   Skin:     General: Skin is warm and dry.      Coloration: Skin is not pale.      Findings: Rash present. Rash is macular and papular. Rash is not vesicular.      Comments: Maculopapular, erythematous rash at lateral upper left thigh and left lower abdominal wall; no discharge   Neurological:      Mental Status: He is alert and oriented to person, place, and time.      Sensory: No sensory deficit.      Motor: No weakness.      Coordination: Coordination normal.   Psychiatric:         Behavior: Behavior normal. Behavior is cooperative.       Assessment/Plan:     1. Rash and nonspecific skin eruption    2. Pruritus  - hydrOXYzine HCl (ATARAX) 25 MG Tab; Take 1 Tab by mouth every 6 hours as needed for Itching. Caution drowsiness.  Dispense: 30 Tab; Refill: 0    3. Dermatitis  - triamcinolone acetonide (KENALOG) 0.1 % Cream; Apply 1 Application topically 2 times a day for 7 days.  Dispense: 45 g; Refill: 0    Rx as above sent electronically. Identify and avoid possible allergens.    Differential diagnosis, natural history, supportive care, over-the-counter symptom management per 's instructions, close monitoring, and indications for immediate follow-up discussed.     Return precautions discussed.    Patient advised to: Return for 1) Symptoms that worsen/don't improve, or go to ER, 2) Follow up with primary care in 7-10 days.    All questions answered. Patient agrees with the plan of care.

## 2020-11-22 LAB
CHOLEST SERPL-MCNC: 170 MG/DL
FASTING STATUS PATIENT QL REPORTED: NORMAL
HDL PARTICAL NO Q4363: 32 UMOL/L
HDL SIZE Q4361: 8.5 NM
HDLC SERPL-MCNC: 47 MG/DL (ref 40–59)
HLD.LARGE SERPL-SCNC: <2.8 UMOL/L
L VLDL PART NO Q4357: 2.5 NMOL/L
LDL SERPL QN: 20.9 NM
LDL SERPL-SCNC: 1333 NMOL/L
LDL SMALL SERPL-SCNC: 684 NMOL/L
LDLC SERPL CALC-MCNC: 95 MG/DL
PATHOLOGY STUDY: ABNORMAL
TRIGL SERPL-MCNC: 142 MG/DL (ref 30–149)
VLDL SIZE Q4362: 46 NM

## 2020-12-10 ENCOUNTER — PATIENT MESSAGE (OUTPATIENT)
Dept: MEDICAL GROUP | Facility: PHYSICIAN GROUP | Age: 60
End: 2020-12-10

## 2020-12-10 DIAGNOSIS — L30.9 DERMATITIS: ICD-10-CM

## 2020-12-10 RX ORDER — OMEPRAZOLE 20 MG/1
20 CAPSULE, DELAYED RELEASE ORAL DAILY
Qty: 90 CAP | Refills: 3 | Status: SHIPPED | OUTPATIENT
Start: 2020-12-10

## 2020-12-10 RX ORDER — TRIAMCINOLONE ACETONIDE 1 MG/G
CREAM TOPICAL
Qty: 80 G | Refills: 3 | Status: SHIPPED | OUTPATIENT
Start: 2020-12-10

## 2021-03-15 DIAGNOSIS — Z23 NEED FOR VACCINATION: ICD-10-CM

## 2021-03-23 ENCOUNTER — IMMUNIZATION (OUTPATIENT)
Dept: FAMILY PLANNING/WOMEN'S HEALTH CLINIC | Facility: IMMUNIZATION CENTER | Age: 61
End: 2021-03-23
Attending: INTERNAL MEDICINE
Payer: COMMERCIAL

## 2021-03-23 DIAGNOSIS — Z23 ENCOUNTER FOR VACCINATION: Primary | ICD-10-CM

## 2021-03-23 DIAGNOSIS — Z23 NEED FOR VACCINATION: ICD-10-CM

## 2021-03-23 PROCEDURE — 0001A PFIZER SARS-COV-2 VACCINE: CPT | Performed by: INTERNAL MEDICINE

## 2021-03-23 PROCEDURE — 91300 PFIZER SARS-COV-2 VACCINE: CPT | Performed by: INTERNAL MEDICINE

## 2021-04-07 ENCOUNTER — PATIENT OUTREACH (OUTPATIENT)
Dept: SCHEDULING | Facility: IMAGING CENTER | Age: 61
End: 2021-04-07

## 2021-04-09 ENCOUNTER — IMMUNIZATION (OUTPATIENT)
Dept: FAMILY PLANNING/WOMEN'S HEALTH CLINIC | Facility: IMMUNIZATION CENTER | Age: 61
End: 2021-04-09
Payer: COMMERCIAL

## 2021-04-09 DIAGNOSIS — Z23 ENCOUNTER FOR VACCINATION: Primary | ICD-10-CM

## 2021-04-09 PROCEDURE — 91300 PFIZER SARS-COV-2 VACCINE: CPT

## 2021-04-09 PROCEDURE — 0002A PFIZER SARS-COV-2 VACCINE: CPT

## 2021-11-12 ENCOUNTER — HOSPITAL ENCOUNTER (OUTPATIENT)
Dept: LAB | Facility: MEDICAL CENTER | Age: 61
End: 2021-11-12
Attending: PHYSICIAN ASSISTANT
Payer: COMMERCIAL

## 2021-11-12 LAB
ALBUMIN SERPL BCP-MCNC: 3.9 G/DL (ref 3.2–4.9)
ALBUMIN/GLOB SERPL: 1.4 G/DL
ALP SERPL-CCNC: 71 U/L (ref 30–99)
ALT SERPL-CCNC: 12 U/L (ref 2–50)
ANION GAP SERPL CALC-SCNC: 9 MMOL/L (ref 7–16)
AST SERPL-CCNC: 12 U/L (ref 12–45)
BILIRUB SERPL-MCNC: 0.4 MG/DL (ref 0.1–1.5)
BUN SERPL-MCNC: 18 MG/DL (ref 8–22)
CALCIUM SERPL-MCNC: 8.8 MG/DL (ref 8.4–10.2)
CHLORIDE SERPL-SCNC: 103 MMOL/L (ref 96–112)
CHOLEST SERPL-MCNC: 144 MG/DL (ref 100–199)
CO2 SERPL-SCNC: 28 MMOL/L (ref 20–33)
CREAT SERPL-MCNC: 0.76 MG/DL (ref 0.5–1.4)
EST. AVERAGE GLUCOSE BLD GHB EST-MCNC: 237 MG/DL
FASTING STATUS PATIENT QL REPORTED: NORMAL
GLOBULIN SER CALC-MCNC: 2.8 G/DL (ref 1.9–3.5)
GLUCOSE SERPL-MCNC: 184 MG/DL (ref 65–99)
HBA1C MFR BLD: 9.9 % (ref 4–5.6)
HDLC SERPL-MCNC: 44 MG/DL
LDLC SERPL CALC-MCNC: 77 MG/DL
POTASSIUM SERPL-SCNC: 4.1 MMOL/L (ref 3.6–5.5)
PROT SERPL-MCNC: 6.7 G/DL (ref 6–8.2)
SODIUM SERPL-SCNC: 140 MMOL/L (ref 135–145)
TRIGL SERPL-MCNC: 115 MG/DL (ref 0–149)

## 2021-11-12 PROCEDURE — 83036 HEMOGLOBIN GLYCOSYLATED A1C: CPT

## 2021-11-12 PROCEDURE — 82043 UR ALBUMIN QUANTITATIVE: CPT

## 2021-11-12 PROCEDURE — 80053 COMPREHEN METABOLIC PANEL: CPT

## 2021-11-12 PROCEDURE — 36415 COLL VENOUS BLD VENIPUNCTURE: CPT

## 2021-11-12 PROCEDURE — 80061 LIPID PANEL: CPT

## 2021-11-12 PROCEDURE — 82570 ASSAY OF URINE CREATININE: CPT

## 2021-11-13 LAB
CREAT UR-MCNC: 73.02 MG/DL
MICROALBUMIN UR-MCNC: 2.8 MG/DL
MICROALBUMIN/CREAT UR: 38 MG/G (ref 0–30)

## 2021-11-15 ENCOUNTER — HOSPITAL ENCOUNTER (OUTPATIENT)
Dept: LAB | Facility: MEDICAL CENTER | Age: 61
End: 2021-11-15
Attending: PHYSICIAN ASSISTANT
Payer: COMMERCIAL

## 2021-11-15 LAB
T4 FREE SERPL-MCNC: 1.28 NG/DL (ref 0.93–1.7)
TSH SERPL DL<=0.005 MIU/L-ACNC: 1.33 UIU/ML (ref 0.38–5.33)

## 2021-11-15 PROCEDURE — 84439 ASSAY OF FREE THYROXINE: CPT

## 2021-11-15 PROCEDURE — 36415 COLL VENOUS BLD VENIPUNCTURE: CPT

## 2021-11-15 PROCEDURE — 84443 ASSAY THYROID STIM HORMONE: CPT

## 2022-08-31 ENCOUNTER — HOSPITAL ENCOUNTER (OUTPATIENT)
Dept: LAB | Facility: MEDICAL CENTER | Age: 62
End: 2022-08-31
Attending: INTERNAL MEDICINE
Payer: COMMERCIAL

## 2022-08-31 LAB
ALBUMIN SERPL BCP-MCNC: 4.1 G/DL (ref 3.2–4.9)
ALBUMIN/GLOB SERPL: 1.5 G/DL
ALP SERPL-CCNC: 68 U/L (ref 30–99)
ALT SERPL-CCNC: 13 U/L (ref 2–50)
ANION GAP SERPL CALC-SCNC: 10 MMOL/L (ref 7–16)
AST SERPL-CCNC: 14 U/L (ref 12–45)
BILIRUB SERPL-MCNC: 0.4 MG/DL (ref 0.1–1.5)
BUN SERPL-MCNC: 18 MG/DL (ref 8–22)
CALCIUM SERPL-MCNC: 9.3 MG/DL (ref 8.5–10.5)
CHLORIDE SERPL-SCNC: 101 MMOL/L (ref 96–112)
CO2 SERPL-SCNC: 28 MMOL/L (ref 20–33)
CREAT SERPL-MCNC: 0.81 MG/DL (ref 0.5–1.4)
EST. AVERAGE GLUCOSE BLD GHB EST-MCNC: 246 MG/DL
FASTING STATUS PATIENT QL REPORTED: NORMAL
GFR SERPLBLD CREATININE-BSD FMLA CKD-EPI: 99 ML/MIN/1.73 M 2
GLOBULIN SER CALC-MCNC: 2.7 G/DL (ref 1.9–3.5)
GLUCOSE SERPL-MCNC: 117 MG/DL (ref 65–99)
HBA1C MFR BLD: 10.2 % (ref 4–5.6)
POTASSIUM SERPL-SCNC: 4.4 MMOL/L (ref 3.6–5.5)
PROT SERPL-MCNC: 6.8 G/DL (ref 6–8.2)
SODIUM SERPL-SCNC: 139 MMOL/L (ref 135–145)

## 2022-08-31 PROCEDURE — 36415 COLL VENOUS BLD VENIPUNCTURE: CPT

## 2022-08-31 PROCEDURE — 83036 HEMOGLOBIN GLYCOSYLATED A1C: CPT

## 2022-08-31 PROCEDURE — 82043 UR ALBUMIN QUANTITATIVE: CPT

## 2022-08-31 PROCEDURE — 80053 COMPREHEN METABOLIC PANEL: CPT

## 2022-08-31 PROCEDURE — 82570 ASSAY OF URINE CREATININE: CPT

## 2022-08-31 PROCEDURE — 82306 VITAMIN D 25 HYDROXY: CPT

## 2022-09-01 LAB
25(OH)D3 SERPL-MCNC: 48 NG/ML (ref 30–100)
CREAT UR-MCNC: 36.18 MG/DL
MICROALBUMIN UR-MCNC: <1.2 MG/DL
MICROALBUMIN/CREAT UR: NORMAL MG/G (ref 0–30)

## 2022-09-28 ENCOUNTER — HOSPITAL ENCOUNTER (OUTPATIENT)
Facility: MEDICAL CENTER | Age: 62
End: 2022-09-28
Attending: PHYSICIAN ASSISTANT
Payer: COMMERCIAL

## 2022-09-28 PROCEDURE — 84402 ASSAY OF FREE TESTOSTERONE: CPT

## 2022-09-28 PROCEDURE — 84270 ASSAY OF SEX HORMONE GLOBUL: CPT

## 2022-09-28 PROCEDURE — 84403 ASSAY OF TOTAL TESTOSTERONE: CPT

## 2022-10-04 LAB
SHBG SERPL-SCNC: 35 NMOL/L (ref 19–76)
TESTOST FREE MFR SERPL: 1.7 % (ref 1.6–2.9)
TESTOST FREE SERPL-MCNC: 38 PG/ML (ref 47–244)
TESTOST SERPL-MCNC: 225 NG/DL (ref 300–720)

## 2022-12-27 ENCOUNTER — HOSPITAL ENCOUNTER (OUTPATIENT)
Dept: LAB | Facility: MEDICAL CENTER | Age: 62
End: 2022-12-27
Attending: PHYSICIAN ASSISTANT
Payer: COMMERCIAL

## 2022-12-27 LAB
CHOLEST SERPL-MCNC: 158 MG/DL (ref 100–199)
EST. AVERAGE GLUCOSE BLD GHB EST-MCNC: 229 MG/DL
FASTING STATUS PATIENT QL REPORTED: NORMAL
HBA1C MFR BLD: 9.6 % (ref 4–5.6)
HDLC SERPL-MCNC: 40 MG/DL
LDLC SERPL CALC-MCNC: 88 MG/DL
T4 FREE SERPL-MCNC: 1.26 NG/DL (ref 0.93–1.7)
TRIGL SERPL-MCNC: 148 MG/DL (ref 0–149)
TSH SERPL DL<=0.005 MIU/L-ACNC: 1.75 UIU/ML (ref 0.38–5.33)

## 2022-12-27 PROCEDURE — 83036 HEMOGLOBIN GLYCOSYLATED A1C: CPT

## 2022-12-27 PROCEDURE — 84439 ASSAY OF FREE THYROXINE: CPT

## 2022-12-27 PROCEDURE — 80061 LIPID PANEL: CPT

## 2022-12-27 PROCEDURE — 36415 COLL VENOUS BLD VENIPUNCTURE: CPT

## 2022-12-27 PROCEDURE — 84443 ASSAY THYROID STIM HORMONE: CPT

## 2023-09-06 ENCOUNTER — HOSPITAL ENCOUNTER (OUTPATIENT)
Dept: LAB | Facility: MEDICAL CENTER | Age: 63
End: 2023-09-06
Attending: INTERNAL MEDICINE
Payer: COMMERCIAL

## 2023-09-06 LAB
ALBUMIN SERPL BCP-MCNC: 4 G/DL (ref 3.2–4.9)
ALBUMIN/GLOB SERPL: 1.4 G/DL
ALP SERPL-CCNC: 61 U/L (ref 30–99)
ALT SERPL-CCNC: 11 U/L (ref 2–50)
ANION GAP SERPL CALC-SCNC: 9 MMOL/L (ref 7–16)
AST SERPL-CCNC: 15 U/L (ref 12–45)
BILIRUB SERPL-MCNC: 0.3 MG/DL (ref 0.1–1.5)
BUN SERPL-MCNC: 19 MG/DL (ref 8–22)
CALCIUM ALBUM COR SERPL-MCNC: 9.1 MG/DL (ref 8.5–10.5)
CALCIUM SERPL-MCNC: 9.1 MG/DL (ref 8.5–10.5)
CHLORIDE SERPL-SCNC: 103 MMOL/L (ref 96–112)
CHOLEST SERPL-MCNC: 145 MG/DL (ref 100–199)
CO2 SERPL-SCNC: 28 MMOL/L (ref 20–33)
CREAT SERPL-MCNC: 0.91 MG/DL (ref 0.5–1.4)
CREAT UR-MCNC: 69.22 MG/DL
EST. AVERAGE GLUCOSE BLD GHB EST-MCNC: 174 MG/DL
FASTING STATUS PATIENT QL REPORTED: NORMAL
GFR SERPLBLD CREATININE-BSD FMLA CKD-EPI: 94 ML/MIN/1.73 M 2
GLOBULIN SER CALC-MCNC: 2.8 G/DL (ref 1.9–3.5)
GLUCOSE SERPL-MCNC: 126 MG/DL (ref 65–99)
HBA1C MFR BLD: 7.7 % (ref 4–5.6)
HDLC SERPL-MCNC: 44 MG/DL
LDLC SERPL CALC-MCNC: 78 MG/DL
MICROALBUMIN UR-MCNC: <1.2 MG/DL
MICROALBUMIN/CREAT UR: NORMAL MG/G (ref 0–30)
POTASSIUM SERPL-SCNC: 4 MMOL/L (ref 3.6–5.5)
PROT SERPL-MCNC: 6.8 G/DL (ref 6–8.2)
SODIUM SERPL-SCNC: 140 MMOL/L (ref 135–145)
T4 FREE SERPL-MCNC: 1.37 NG/DL (ref 0.93–1.7)
TRIGL SERPL-MCNC: 117 MG/DL (ref 0–149)
TSH SERPL DL<=0.005 MIU/L-ACNC: 2.23 UIU/ML (ref 0.38–5.33)

## 2023-09-06 PROCEDURE — 80061 LIPID PANEL: CPT

## 2023-09-06 PROCEDURE — 36415 COLL VENOUS BLD VENIPUNCTURE: CPT

## 2023-09-06 PROCEDURE — 82570 ASSAY OF URINE CREATININE: CPT

## 2023-09-06 PROCEDURE — 84439 ASSAY OF FREE THYROXINE: CPT

## 2023-09-06 PROCEDURE — 80053 COMPREHEN METABOLIC PANEL: CPT

## 2023-09-06 PROCEDURE — 84443 ASSAY THYROID STIM HORMONE: CPT

## 2023-09-06 PROCEDURE — 82043 UR ALBUMIN QUANTITATIVE: CPT

## 2023-09-06 PROCEDURE — 83036 HEMOGLOBIN GLYCOSYLATED A1C: CPT

## 2023-10-20 ENCOUNTER — PHARMACY VISIT (OUTPATIENT)
Dept: PHARMACY | Facility: MEDICAL CENTER | Age: 63
End: 2023-10-20
Payer: COMMERCIAL

## 2023-10-20 PROCEDURE — RXMED WILLOW AMBULATORY MEDICATION CHARGE: Performed by: INTERNAL MEDICINE

## 2023-10-20 RX ORDER — COVID-19 VACCINE, MRNA 0.23 MG/2.25ML
0.3 INJECTION, SUSPENSION INTRAMUSCULAR
Qty: 0.3 ML | Refills: 0 | OUTPATIENT
Start: 2023-10-20

## 2023-10-20 RX ORDER — INFLUENZA A VIRUS A/BRISBANE/02/2018 IVR-190 (H1N1) ANTIGEN (FORMALDEHYDE INACTIVATED), INFLUENZA A VIRUS A/KANSAS/14/2017 X-327 (H3N2) ANTIGEN (FORMALDEHYDE INACTIVATED), INFLUENZA B VIRUS B/PHUKET/3073/2013 ANTIGEN (FORMALDEHYDE INACTIVATED), AND INFLUENZA B VIRUS B/MARYLAND/15/2016 BX-69A ANTIGEN (FORMALDEHYDE INACTIVATED) 15; 15; 15; 15 UG/.5ML; UG/.5ML; UG/.5ML; UG/.5ML
0.5 INJECTION, SUSPENSION INTRAMUSCULAR
Qty: 0.5 ML | Refills: 0 | OUTPATIENT
Start: 2023-10-20

## 2023-10-27 PROCEDURE — RXMED WILLOW AMBULATORY MEDICATION CHARGE: Performed by: PHYSICIAN ASSISTANT

## 2023-11-01 ENCOUNTER — PHARMACY VISIT (OUTPATIENT)
Dept: PHARMACY | Facility: MEDICAL CENTER | Age: 63
End: 2023-11-01
Payer: COMMERCIAL

## 2023-11-14 PROCEDURE — RXMED WILLOW AMBULATORY MEDICATION CHARGE: Performed by: INTERNAL MEDICINE

## 2023-11-20 ENCOUNTER — PHARMACY VISIT (OUTPATIENT)
Dept: PHARMACY | Facility: MEDICAL CENTER | Age: 63
End: 2023-11-20
Payer: COMMERCIAL

## 2023-11-22 ENCOUNTER — PHARMACY VISIT (OUTPATIENT)
Dept: PHARMACY | Facility: MEDICAL CENTER | Age: 63
End: 2023-11-22
Payer: COMMERCIAL

## 2023-11-22 PROCEDURE — RXMED WILLOW AMBULATORY MEDICATION CHARGE: Performed by: INTERNAL MEDICINE

## 2023-11-22 RX ORDER — ATORVASTATIN CALCIUM 80 MG/1
80 TABLET, FILM COATED ORAL DAILY
Qty: 90 TABLET | Refills: 1 | OUTPATIENT
Start: 2023-11-22

## 2023-12-15 PROCEDURE — RXMED WILLOW AMBULATORY MEDICATION CHARGE: Performed by: PHYSICIAN ASSISTANT

## 2023-12-22 ENCOUNTER — PHARMACY VISIT (OUTPATIENT)
Dept: PHARMACY | Facility: MEDICAL CENTER | Age: 63
End: 2023-12-22
Payer: COMMERCIAL

## 2023-12-22 PROCEDURE — RXMED WILLOW AMBULATORY MEDICATION CHARGE: Performed by: INTERNAL MEDICINE

## 2023-12-22 PROCEDURE — RXMED WILLOW AMBULATORY MEDICATION CHARGE: Performed by: PHYSICIAN ASSISTANT

## 2023-12-22 RX ORDER — DULAGLUTIDE 4.5 MG/.5ML
INJECTION, SOLUTION SUBCUTANEOUS
Qty: 2 ML | Refills: 0 | OUTPATIENT
Start: 2023-12-22

## 2024-01-02 PROCEDURE — RXMED WILLOW AMBULATORY MEDICATION CHARGE: Performed by: INTERNAL MEDICINE

## 2024-01-04 ENCOUNTER — PHARMACY VISIT (OUTPATIENT)
Dept: PHARMACY | Facility: MEDICAL CENTER | Age: 64
End: 2024-01-04
Payer: COMMERCIAL

## 2024-01-16 ENCOUNTER — HOSPITAL ENCOUNTER (OUTPATIENT)
Dept: LAB | Facility: MEDICAL CENTER | Age: 64
End: 2024-01-16
Attending: INTERNAL MEDICINE
Payer: COMMERCIAL

## 2024-01-16 LAB
ALBUMIN SERPL BCP-MCNC: 4 G/DL (ref 3.2–4.9)
ALBUMIN/GLOB SERPL: 1.5 G/DL
ALP SERPL-CCNC: 68 U/L (ref 30–99)
ALT SERPL-CCNC: 17 U/L (ref 2–50)
ANION GAP SERPL CALC-SCNC: 10 MMOL/L (ref 7–16)
AST SERPL-CCNC: 14 U/L (ref 12–45)
BILIRUB SERPL-MCNC: 0.4 MG/DL (ref 0.1–1.5)
BUN SERPL-MCNC: 15 MG/DL (ref 8–22)
CALCIUM ALBUM COR SERPL-MCNC: 8.8 MG/DL (ref 8.5–10.5)
CALCIUM SERPL-MCNC: 8.8 MG/DL (ref 8.5–10.5)
CHLORIDE SERPL-SCNC: 104 MMOL/L (ref 96–112)
CHOLEST SERPL-MCNC: 144 MG/DL (ref 100–199)
CO2 SERPL-SCNC: 27 MMOL/L (ref 20–33)
CREAT SERPL-MCNC: 0.79 MG/DL (ref 0.5–1.4)
EST. AVERAGE GLUCOSE BLD GHB EST-MCNC: 192 MG/DL
FASTING STATUS PATIENT QL REPORTED: NORMAL
GFR SERPLBLD CREATININE-BSD FMLA CKD-EPI: 99 ML/MIN/1.73 M 2
GLOBULIN SER CALC-MCNC: 2.7 G/DL (ref 1.9–3.5)
GLUCOSE SERPL-MCNC: 147 MG/DL (ref 65–99)
HBA1C MFR BLD: 8.3 % (ref 4–5.6)
HDLC SERPL-MCNC: 46 MG/DL
LDLC SERPL CALC-MCNC: 83 MG/DL
PHOSPHATE SERPL-MCNC: 4 MG/DL (ref 2.5–4.5)
POTASSIUM SERPL-SCNC: 4 MMOL/L (ref 3.6–5.5)
PROT SERPL-MCNC: 6.7 G/DL (ref 6–8.2)
SODIUM SERPL-SCNC: 141 MMOL/L (ref 135–145)
TRIGL SERPL-MCNC: 74 MG/DL (ref 0–149)

## 2024-01-16 PROCEDURE — 80061 LIPID PANEL: CPT

## 2024-01-16 PROCEDURE — 80053 COMPREHEN METABOLIC PANEL: CPT

## 2024-01-16 PROCEDURE — 84100 ASSAY OF PHOSPHORUS: CPT

## 2024-01-16 PROCEDURE — 36415 COLL VENOUS BLD VENIPUNCTURE: CPT

## 2024-01-16 PROCEDURE — 83036 HEMOGLOBIN GLYCOSYLATED A1C: CPT

## 2024-01-18 PROCEDURE — RXMED WILLOW AMBULATORY MEDICATION CHARGE: Performed by: PHYSICIAN ASSISTANT

## 2024-01-25 PROCEDURE — RXMED WILLOW AMBULATORY MEDICATION CHARGE: Performed by: PHYSICIAN ASSISTANT

## 2024-02-06 ENCOUNTER — PHARMACY VISIT (OUTPATIENT)
Dept: PHARMACY | Facility: MEDICAL CENTER | Age: 64
End: 2024-02-06
Payer: COMMERCIAL

## 2024-03-12 PROCEDURE — RXMED WILLOW AMBULATORY MEDICATION CHARGE: Performed by: INTERNAL MEDICINE

## 2024-03-12 PROCEDURE — RXMED WILLOW AMBULATORY MEDICATION CHARGE: Performed by: PHYSICIAN ASSISTANT

## 2024-03-13 ENCOUNTER — PHARMACY VISIT (OUTPATIENT)
Dept: PHARMACY | Facility: MEDICAL CENTER | Age: 64
End: 2024-03-13
Payer: COMMERCIAL

## 2024-03-28 PROCEDURE — RXMED WILLOW AMBULATORY MEDICATION CHARGE: Performed by: INTERNAL MEDICINE

## 2024-03-29 ENCOUNTER — PHARMACY VISIT (OUTPATIENT)
Dept: PHARMACY | Facility: MEDICAL CENTER | Age: 64
End: 2024-03-29
Payer: COMMERCIAL

## 2024-03-29 PROCEDURE — RXOTC WILLOW AMBULATORY OTC CHARGE: Performed by: PHARMACIST

## 2024-04-11 PROCEDURE — RXMED WILLOW AMBULATORY MEDICATION CHARGE: Performed by: PHYSICIAN ASSISTANT

## 2024-04-12 ENCOUNTER — PHARMACY VISIT (OUTPATIENT)
Dept: PHARMACY | Facility: MEDICAL CENTER | Age: 64
End: 2024-04-12
Payer: COMMERCIAL

## 2024-05-02 ENCOUNTER — OFFICE VISIT (OUTPATIENT)
Dept: MEDICAL GROUP | Facility: MEDICAL CENTER | Age: 64
End: 2024-05-02
Payer: COMMERCIAL

## 2024-05-02 VITALS
DIASTOLIC BLOOD PRESSURE: 62 MMHG | OXYGEN SATURATION: 96 % | HEART RATE: 77 BPM | HEIGHT: 71 IN | WEIGHT: 222.4 LBS | SYSTOLIC BLOOD PRESSURE: 112 MMHG | TEMPERATURE: 97.6 F | BODY MASS INDEX: 31.14 KG/M2

## 2024-05-02 DIAGNOSIS — Z00.00 ENCOUNTER FOR MEDICAL EXAMINATION TO ESTABLISH CARE: ICD-10-CM

## 2024-05-02 DIAGNOSIS — E11.319 TYPE 2 DIABETES MELLITUS WITH BOTH EYES AFFECTED BY RETINOPATHY WITHOUT MACULAR EDEMA, WITH LONG-TERM CURRENT USE OF INSULIN, UNSPECIFIED RETINOPATHY SEVERITY (HCC): ICD-10-CM

## 2024-05-02 DIAGNOSIS — Z23 IMMUNIZATION DUE: ICD-10-CM

## 2024-05-02 DIAGNOSIS — Z79.4 TYPE 2 DIABETES MELLITUS WITH MICROALBUMINURIA, WITH LONG-TERM CURRENT USE OF INSULIN (HCC): ICD-10-CM

## 2024-05-02 DIAGNOSIS — Z12.11 COLON CANCER SCREENING: ICD-10-CM

## 2024-05-02 DIAGNOSIS — R80.9 TYPE 2 DIABETES MELLITUS WITH MICROALBUMINURIA, WITH LONG-TERM CURRENT USE OF INSULIN (HCC): ICD-10-CM

## 2024-05-02 DIAGNOSIS — I10 PRIMARY HYPERTENSION: ICD-10-CM

## 2024-05-02 DIAGNOSIS — E11.29 TYPE 2 DIABETES MELLITUS WITH MICROALBUMINURIA, WITH LONG-TERM CURRENT USE OF INSULIN (HCC): ICD-10-CM

## 2024-05-02 DIAGNOSIS — E78.2 MIXED HYPERLIPIDEMIA: ICD-10-CM

## 2024-05-02 DIAGNOSIS — Z79.4 TYPE 2 DIABETES MELLITUS WITH BOTH EYES AFFECTED BY RETINOPATHY WITHOUT MACULAR EDEMA, WITH LONG-TERM CURRENT USE OF INSULIN, UNSPECIFIED RETINOPATHY SEVERITY (HCC): ICD-10-CM

## 2024-05-02 PROBLEM — J06.9 VIRAL UPPER RESPIRATORY TRACT INFECTION: Status: RESOLVED | Noted: 2020-01-24 | Resolved: 2024-05-02

## 2024-05-02 PROCEDURE — 90471 IMMUNIZATION ADMIN: CPT | Performed by: STUDENT IN AN ORGANIZED HEALTH CARE EDUCATION/TRAINING PROGRAM

## 2024-05-02 PROCEDURE — 99204 OFFICE O/P NEW MOD 45 MIN: CPT | Mod: 25 | Performed by: STUDENT IN AN ORGANIZED HEALTH CARE EDUCATION/TRAINING PROGRAM

## 2024-05-02 PROCEDURE — 90677 PCV20 VACCINE IM: CPT | Performed by: STUDENT IN AN ORGANIZED HEALTH CARE EDUCATION/TRAINING PROGRAM

## 2024-05-02 PROCEDURE — 3074F SYST BP LT 130 MM HG: CPT | Performed by: STUDENT IN AN ORGANIZED HEALTH CARE EDUCATION/TRAINING PROGRAM

## 2024-05-02 PROCEDURE — 3078F DIAST BP <80 MM HG: CPT | Performed by: STUDENT IN AN ORGANIZED HEALTH CARE EDUCATION/TRAINING PROGRAM

## 2024-05-02 NOTE — PROGRESS NOTES
Subjective:     CC:  Diagnoses of Encounter for medical examination to establish care, Type 2 diabetes mellitus with microalbuminuria, with long-term current use of insulin (HCC), Type 2 diabetes mellitus with both eyes affected by retinopathy without macular edema, with long-term current use of insulin, unspecified retinopathy severity (HCC), Mixed hyperlipidemia, Primary hypertension, Immunization due, and Colon cancer screening were pertinent to this visit.    HISTORY OF THE PRESENT ILLNESS: Patient is a 64 y.o. male. This pleasant patient is here today to establish care and discuss the following    Problem   Type 2 Diabetes Mellitus With Microalbuminuria, With Long-Term Current Use of Insulin (Hcc)    Follows with Laura Mendoza, currently on empagliflozin, metformin, long-acting and short acting insulin, and Mounjaro     Mixed Hyperlipidemia    This is a chronic condition, currently on atorvastatin 40 mg nightly    Lab Results   Component Value Date/Time    CHOLSTRLTOT 144 01/16/2024 06:29 AM    LDL 83 01/16/2024 06:29 AM    HDL 46 01/16/2024 06:29 AM    TRIGLYCERIDE 74 01/16/2024 06:29 AM       Lab Results   Component Value Date/Time    SODIUM 141 01/16/2024 06:29 AM    POTASSIUM 4.0 01/16/2024 06:29 AM    CHLORIDE 104 01/16/2024 06:29 AM    CO2 27 01/16/2024 06:29 AM    GLUCOSE 147 (H) 01/16/2024 06:29 AM    BUN 15 01/16/2024 06:29 AM    CREATININE 0.79 01/16/2024 06:29 AM     Lab Results   Component Value Date/Time    ALKPHOSPHAT 68 01/16/2024 06:29 AM    ASTSGOT 14 01/16/2024 06:29 AM    ALTSGPT 17 01/16/2024 06:29 AM    TBILIRUBIN 0.4 01/16/2024 06:29 AM           Type 2 Diabetes Mellitus With Retinopathy, With Long-Term Current Use of Insulin (Hcc)    Due for retinal exam, he does have an ophthalmologist but has not seen them in several years.  Patient states he will make appointment.     Htn (Hypertension)    Chronic condition, currently well-controlled with losartan 50 mg daily and carvedilol 12.5 mg twice  "daily     Viral Upper Respiratory Tract Infection (Resolved)       ROS:   ROS      Objective:     Exam: /62 (BP Location: Left arm, Patient Position: Sitting, BP Cuff Size: Large adult)   Pulse 77   Temp 36.4 °C (97.6 °F) (Temporal)   Ht 1.803 m (5' 11\")   Wt 101 kg (222 lb 6.4 oz)   SpO2 96%  Body mass index is 31.02 kg/m².    Physical Exam  Vitals reviewed.   Constitutional:       General: He is not in acute distress.     Appearance: He is not toxic-appearing.   HENT:      Head: Normocephalic and atraumatic.      Right Ear: External ear normal.      Left Ear: Tympanic membrane, ear canal and external ear normal.   Eyes:      General:         Right eye: No discharge.         Left eye: No discharge.      Extraocular Movements: Extraocular movements intact.      Conjunctiva/sclera: Conjunctivae normal.   Cardiovascular:      Rate and Rhythm: Normal rate and regular rhythm.      Heart sounds: Normal heart sounds. No murmur heard.  Pulmonary:      Effort: Pulmonary effort is normal. No respiratory distress.      Breath sounds: Normal breath sounds. No wheezing or rales.   Skin:     General: Skin is warm and dry.   Neurological:      Mental Status: He is alert.   Psychiatric:         Mood and Affect: Mood normal.         Behavior: Behavior normal.         Thought Content: Thought content normal.         Judgment: Judgment normal.           Assessment & Plan:   64 y.o. male with the following -    1. Encounter for medical examination to establish care  History, problem list, medications and allergies reviewed.  Records requested from previous provider if applicable.    2. Type 2 diabetes mellitus with microalbuminuria, with long-term current use of insulin ([facility])  Continue to follow with endocrinology    3. Type 2 diabetes mellitus with both eyes affected by retinopathy without macular edema, with long-term current use of insulin, unspecified retinopathy severity (HCC)  Continue follow with endocrinology, " discussed the importance of getting an updated eye exam    4. Mixed hyperlipidemia  Continue atorvastatin, well-controlled    5. Primary hypertension  Continue losartan and carvedilol, well-controlled    6. Immunization due  Pneumonia shot today, patient declines tetanus shot  - Pneumococcal Conjugate Vaccine 20-Valent (6 wks+)    7. Colon cancer screening  Declines colon cancer screening      No follow-ups on file.    Please note that this dictation was created using voice recognition software. I have made every reasonable attempt to correct obvious errors, but I expect that there are errors of grammar and possibly content that I did not discover before finalizing the note.

## 2024-05-13 PROCEDURE — RXMED WILLOW AMBULATORY MEDICATION CHARGE: Performed by: PHYSICIAN ASSISTANT

## 2024-05-13 PROCEDURE — RXMED WILLOW AMBULATORY MEDICATION CHARGE: Performed by: INTERNAL MEDICINE

## 2024-05-14 ENCOUNTER — PHARMACY VISIT (OUTPATIENT)
Dept: PHARMACY | Facility: MEDICAL CENTER | Age: 64
End: 2024-05-14
Payer: COMMERCIAL

## 2024-06-09 PROCEDURE — RXMED WILLOW AMBULATORY MEDICATION CHARGE: Performed by: PHYSICIAN ASSISTANT

## 2024-06-10 ENCOUNTER — OFFICE VISIT (OUTPATIENT)
Dept: URGENT CARE | Facility: CLINIC | Age: 64
End: 2024-06-10
Payer: COMMERCIAL

## 2024-06-10 ENCOUNTER — PHARMACY VISIT (OUTPATIENT)
Dept: PHARMACY | Facility: MEDICAL CENTER | Age: 64
End: 2024-06-10
Payer: COMMERCIAL

## 2024-06-10 VITALS
OXYGEN SATURATION: 98 % | HEIGHT: 71 IN | TEMPERATURE: 98 F | RESPIRATION RATE: 19 BRPM | BODY MASS INDEX: 31.22 KG/M2 | HEART RATE: 64 BPM | WEIGHT: 223 LBS | SYSTOLIC BLOOD PRESSURE: 126 MMHG | DIASTOLIC BLOOD PRESSURE: 82 MMHG

## 2024-06-10 DIAGNOSIS — J06.9 VIRAL URI: ICD-10-CM

## 2024-06-10 DIAGNOSIS — R05.1 ACUTE COUGH: ICD-10-CM

## 2024-06-10 PROCEDURE — 99213 OFFICE O/P EST LOW 20 MIN: CPT | Performed by: PHYSICIAN ASSISTANT

## 2024-06-10 PROCEDURE — 3079F DIAST BP 80-89 MM HG: CPT | Performed by: PHYSICIAN ASSISTANT

## 2024-06-10 PROCEDURE — 3074F SYST BP LT 130 MM HG: CPT | Performed by: PHYSICIAN ASSISTANT

## 2024-06-10 PROCEDURE — RXMED WILLOW AMBULATORY MEDICATION CHARGE: Performed by: PHYSICIAN ASSISTANT

## 2024-06-10 RX ORDER — BENZONATATE 100 MG/1
100 CAPSULE ORAL 3 TIMES DAILY PRN
Qty: 60 CAPSULE | Refills: 0 | Status: SHIPPED | OUTPATIENT
Start: 2024-06-10

## 2024-06-10 ASSESSMENT — ENCOUNTER SYMPTOMS
EYE REDNESS: 0
MYALGIAS: 1
SORE THROAT: 1
VOMITING: 0
DIARRHEA: 1
HEADACHES: 1
NAUSEA: 0
RHINORRHEA: 1
SHORTNESS OF BREATH: 1
FEVER: 0
EYE DISCHARGE: 0
COUGH: 1

## 2024-06-10 NOTE — PROGRESS NOTES
Subjective     Kenney Espinal Jr is a 64 y.o. male who presents with Cough (2 days), Congestion (2 days ), and Sinus Problem (2 days)            URI   This is a new problem. Episode onset: x 2 days ago. The problem has been unchanged. There has been no fever. Associated symptoms include congestion, coughing, diarrhea, headaches, rhinorrhea and a sore throat. Pertinent negatives include no ear pain, nausea or vomiting. Treatments tried: OTC Robitussin DM.     The patient states he has been helping care for his mother-in-law who is also sick with similar symptoms.    PMH:  has a past medical history of Chronic pain of both shoulders (7/25/2019), Diabetes, DIABETES MELLITUS (1/8/2013), Gastroesophageal reflux disease with esophagitis (7/25/2019), HTN (hypertension) (1/8/2013), Mixed hyperlipidemia (7/25/2019), and Other chest pain (1/8/2013).  MEDS:   Current Outpatient Medications:     Insulin Pen Needle 32 G x 4 mm, Use up to 5 times a day subcutaneously for 90 days, Disp: 450 Each, Rfl: 3    atorvastatin (LIPITOR) 40 MG Tab, 1 tablet Orally every day 30 day(s), Disp: 30 Tablet, Rfl: 3    insulin lispro (HUMALOG KWIKPEN) 100 UNIT/ML SC SOPN injection PEN, Inject 8 units subcutaneously before breakfast, lunch, and dinner every day, Disp: 9 mL, Rfl: 3    Insulin Glargine, 2 Unit Dial, (TOUJEO MAX SOLOSTAR) 300 UNIT/ML Solution Pen-injector, Inject 58 units subcutaneously once daily, Disp: 6 mL, Rfl: 3    losartan (COZAAR) 50 MG Tab, Take 1 tablet Orally every day, Disp: 30 Tablet, Rfl: 3    glucose blood strip, 4 In Vitro every day 90 days, Disp: 360 Strip, Rfl: 3    Dapagliflozin Pro-metFORMIN ER (XIGDUO XR) 5-1000 MG TABLET SR 24 HR, Take 2 tablets Orally every day 90 days, Disp: 180 Tablet, Rfl: 0    carvedilol (COREG) 12.5 MG Tab, Take 1 tablet by mouth twice a day, Disp: 60 Tablet, Rfl: 2    glucose blood strip, Use to test blood sugar 4 times a day, Disp: 360 Strip, Rfl: 3    glucose blood strip, Use one strip  to test 4 times a day, Disp: 360 Strip, Rfl: 3    Tirzepatide (MOUNJARO) 5 MG/0.5ML Solution Pen-injector, Inject 5 mg under the skin once a week, Disp: 2 mL, Rfl: 2    glucose blood strip, Use to test blood sugar 4 times a day, Disp: 360 Strip, Rfl: 3    Insulin Pen Needle 31G X 5 MM Misc, Use one pen needle subcutaneously 5 times a day, Disp: 450 Each, Rfl: 3    Insulin Pen Needle 32G X 6 MM Misc, Use one pen needle subcutaneously 3 times a day, Disp: 300 Each, Rfl: 0    influenza vaccine quad (FLUZONE QUADRIVALENT) 0.5 ML Suspension Prefilled Syringe injection, Inject 0.5 mL into the shoulder, thigh, or buttocks., Disp: 0.5 mL, Rfl: 0    COVID-19 mRNA Vac-Key,Pfizer, (COMIRNATY) 30 MCG/0.3ML Suspension injection, Inject 0.3 mL into the shoulder, thigh, or buttocks., Disp: 0.3 mL, Rfl: 0    omeprazole (PRILOSEC) 20 MG delayed-release capsule, Take 1 Cap by mouth every day., Disp: 90 Cap, Rfl: 3    triamcinolone acetonide (KENALOG) 0.1 % Cream, Apply to affected area twice daily until clear, Disp: 80 g, Rfl: 3    hydrOXYzine HCl (ATARAX) 25 MG Tab, Take 1 Tab by mouth every 6 hours as needed for Itching. Caution drowsiness., Disp: 30 Tab, Rfl: 0    NOVOLOG, insulin aspart, (NOVOLOG FLEXPEN) 100 UNIT/ML Solution Pen-injector injection, Inject 10 Units as instructed every day., Disp: , Rfl:     Insulin Glargine (TOUJEO MAX SOLOSTAR) 300 UNIT/ML Solution Pen-injector, Inject 64 Units as instructed every day., Disp: , Rfl:     atorvastatin (LIPITOR) 40 MG Tab, Take 40 mg by mouth every evening., Disp: , Rfl:     losartan (COZAAR) 50 MG TABS, Take 50 mg by mouth every day.  , Disp: , Rfl:   ALLERGIES:   Allergies   Allergen Reactions    Nkda [No Known Drug Allergy]      SURGHX:   Past Surgical History:   Procedure Laterality Date    OTHER ORTHOPEDIC SURGERY      L knee    TONSILLECTOMY       SOCHX:  reports that he has never smoked. He has never used smokeless tobacco. He reports that he does not drink alcohol and does  "not use drugs.  FH: Family history was reviewed, no pertinent findings to report    Review of Systems   Constitutional:  Negative for fever.   HENT:  Positive for congestion, rhinorrhea and sore throat. Negative for ear pain.    Eyes:  Negative for discharge and redness.   Respiratory:  Positive for cough and shortness of breath (The patient reports intermittent shortness of breath.).    Gastrointestinal:  Positive for diarrhea. Negative for nausea and vomiting.   Musculoskeletal:  Positive for myalgias.   Neurological:  Positive for headaches.              Objective     /82   Pulse 64   Temp 36.7 °C (98 °F) (Temporal)   Resp 19   Ht 1.803 m (5' 11\")   Wt 101 kg (223 lb)   SpO2 98%   BMI 31.10 kg/m²      Physical Exam  Constitutional:       General: He is not in acute distress.     Appearance: Normal appearance. He is not ill-appearing.   HENT:      Head: Normocephalic and atraumatic.      Right Ear: Tympanic membrane, ear canal and external ear normal.      Left Ear: Tympanic membrane, ear canal and external ear normal.      Nose: Nose normal.      Mouth/Throat:      Mouth: Mucous membranes are moist.      Pharynx: Oropharynx is clear. No posterior oropharyngeal erythema.   Eyes:      Extraocular Movements: Extraocular movements intact.      Conjunctiva/sclera: Conjunctivae normal.   Cardiovascular:      Rate and Rhythm: Normal rate and regular rhythm.      Heart sounds: Normal heart sounds.   Pulmonary:      Effort: Pulmonary effort is normal. No respiratory distress.      Breath sounds: Normal breath sounds. No wheezing.   Musculoskeletal:         General: Normal range of motion.      Cervical back: Normal range of motion and neck supple.   Skin:     General: Skin is warm and dry.   Neurological:      Mental Status: He is alert and oriented to person, place, and time.                             Assessment & Plan          1. Viral URI    2. Acute cough  - benzonatate (TESSALON) 100 MG Cap; Take 1 " Capsule by mouth 3 times a day as needed for Cough.  Dispense: 60 Capsule; Refill: 0    The patient's presenting symptoms and physical exam findings are consistent with a viral URI with associated cough.  The patient's physical exam today in clinic was normal.  The patient's lungs were clear to auscultation without stridor or wheezing, and his pulse ox was within normal limits.  The patient is nontoxic and appears in no acute distress.  The patient's vital signs are stable and within normal limits.  He is afebrile today in clinic.  Discussed likely viral etiology with the patient.  The patient declined viral testing at this time. Advised the patient to monitor for worsening signs and/or symptoms. Will prescribe the patient Tessalon for symptomatic leaf of his cough.  Recommend OTC medications and supportive care for symptomatic management.  Recommend patient follow-up with primary care as needed.  Discussed return precautions with the patient, and he verbalized understanding.    Differential diagnoses, supportive care, and indications for immediate follow-up discussed with patient.   Instructed to return to clinic or nearest emergency department for any change in condition, further concerns, or worsening of symptoms.      OTC Tylenol or Motrin for fever/discomfort.  OTC cough/cold medication for symptomatic relief  OTC Supportive Care for Congestion - saline nasal spray or neti pot  Drink plenty of fluids  Follow-up with PCP  Return to clinic or go to the ED if symptoms worsen or fail to improve, or if the patient should develop worsening/increasing cough, congestion, ear pain, sore throat, shortness of breath, wheezing, chest pain, fever/chills, and/or any concerning symptoms.    Discussed plan with the patient, and he agrees to the above.     I personally reviewed prior external notes and test results pertinent to today's visit.  I have independently reviewed and interpreted all diagnostics ordered during this  urgent care visit.     Please note that this dictation was created using voice recognition software. I have made every reasonable attempt to correct obvious errors, but I expect that there may be errors of grammar and possibly content that I did not discover before finalizing the note.     This note was electronically signed by Maryam Pierre PA-C

## 2024-06-18 PROCEDURE — RXMED WILLOW AMBULATORY MEDICATION CHARGE: Performed by: STUDENT IN AN ORGANIZED HEALTH CARE EDUCATION/TRAINING PROGRAM

## 2024-06-18 RX ORDER — LOSARTAN POTASSIUM 50 MG/1
TABLET ORAL
Qty: 90 TABLET | Refills: 3 | Status: SHIPPED | OUTPATIENT
Start: 2024-06-18

## 2024-06-21 ENCOUNTER — HOSPITAL ENCOUNTER (OUTPATIENT)
Dept: LAB | Facility: MEDICAL CENTER | Age: 64
End: 2024-06-21
Attending: STUDENT IN AN ORGANIZED HEALTH CARE EDUCATION/TRAINING PROGRAM
Payer: COMMERCIAL

## 2024-06-21 LAB
EST. AVERAGE GLUCOSE BLD GHB EST-MCNC: 177 MG/DL
HBA1C MFR BLD: 7.8 % (ref 4–5.6)

## 2024-06-21 PROCEDURE — 36415 COLL VENOUS BLD VENIPUNCTURE: CPT

## 2024-06-21 PROCEDURE — 84681 ASSAY OF C-PEPTIDE: CPT

## 2024-06-21 PROCEDURE — RXMED WILLOW AMBULATORY MEDICATION CHARGE: Performed by: INTERNAL MEDICINE

## 2024-06-21 PROCEDURE — 82985 ASSAY OF GLYCATED PROTEIN: CPT

## 2024-06-21 PROCEDURE — 83036 HEMOGLOBIN GLYCOSYLATED A1C: CPT

## 2024-06-23 LAB
C PEPTIDE SERPL-MCNC: 0.6 NG/ML (ref 0.5–3.3)
FRUCTOSAMINE SERPL-SCNC: 341 UMOL/L (ref 205–285)

## 2024-06-24 ENCOUNTER — PHARMACY VISIT (OUTPATIENT)
Dept: PHARMACY | Facility: MEDICAL CENTER | Age: 64
End: 2024-06-24
Payer: COMMERCIAL

## 2024-06-24 PROCEDURE — RXMED WILLOW AMBULATORY MEDICATION CHARGE: Performed by: STUDENT IN AN ORGANIZED HEALTH CARE EDUCATION/TRAINING PROGRAM

## 2024-06-24 RX ORDER — DEXAMETHASONE 1 MG
1 TABLET ORAL
Qty: 1 TABLET | Refills: 0 | OUTPATIENT
Start: 2024-06-24

## 2024-06-24 RX ORDER — DAPAGLIFLOZIN AND METFORMIN HYDROCHLORIDE 5; 1000 MG/1; MG/1
TABLET, FILM COATED, EXTENDED RELEASE ORAL
Qty: 60 TABLET | Refills: 2 | OUTPATIENT
Start: 2024-06-24

## 2024-06-24 RX ORDER — LOSARTAN POTASSIUM 50 MG/1
50 TABLET ORAL DAILY
Qty: 30 TABLET | Refills: 2 | OUTPATIENT
Start: 2024-06-24

## 2024-06-24 RX ORDER — ATORVASTATIN CALCIUM 40 MG/1
40 TABLET, FILM COATED ORAL DAILY
Qty: 30 TABLET | Refills: 2 | OUTPATIENT
Start: 2024-06-24

## 2024-06-24 RX ORDER — INSULIN LISPRO 100 [IU]/ML
INJECTION, SOLUTION INTRAVENOUS; SUBCUTANEOUS
Qty: 8 ML | Refills: 2 | OUTPATIENT
Start: 2024-06-24

## 2024-06-24 RX ORDER — INSULIN GLARGINE 300 U/ML
INJECTION, SOLUTION SUBCUTANEOUS
Qty: 18 ML | Refills: 2 | OUTPATIENT
Start: 2024-06-24

## 2024-06-24 RX ORDER — CARVEDILOL 12.5 MG/1
12.5 TABLET ORAL 2 TIMES DAILY
Qty: 60 TABLET | Refills: 2 | OUTPATIENT
Start: 2024-06-24

## 2024-07-02 ENCOUNTER — PHARMACY VISIT (OUTPATIENT)
Dept: PHARMACY | Facility: MEDICAL CENTER | Age: 64
End: 2024-07-02
Payer: COMMERCIAL

## 2024-07-22 PROCEDURE — RXMED WILLOW AMBULATORY MEDICATION CHARGE: Performed by: STUDENT IN AN ORGANIZED HEALTH CARE EDUCATION/TRAINING PROGRAM

## 2024-07-26 ENCOUNTER — PHARMACY VISIT (OUTPATIENT)
Dept: PHARMACY | Facility: MEDICAL CENTER | Age: 64
End: 2024-07-26
Payer: COMMERCIAL

## 2024-08-09 ENCOUNTER — PHARMACY VISIT (OUTPATIENT)
Dept: PHARMACY | Facility: MEDICAL CENTER | Age: 64
End: 2024-08-09
Payer: COMMERCIAL

## 2024-08-09 PROCEDURE — RXMED WILLOW AMBULATORY MEDICATION CHARGE: Performed by: STUDENT IN AN ORGANIZED HEALTH CARE EDUCATION/TRAINING PROGRAM

## 2024-08-19 PROCEDURE — RXMED WILLOW AMBULATORY MEDICATION CHARGE: Performed by: STUDENT IN AN ORGANIZED HEALTH CARE EDUCATION/TRAINING PROGRAM

## 2024-08-26 ENCOUNTER — PHARMACY VISIT (OUTPATIENT)
Dept: PHARMACY | Facility: MEDICAL CENTER | Age: 64
End: 2024-08-26
Payer: COMMERCIAL

## 2024-09-16 PROCEDURE — RXMED WILLOW AMBULATORY MEDICATION CHARGE: Performed by: PHYSICIAN ASSISTANT

## 2024-09-16 PROCEDURE — RXMED WILLOW AMBULATORY MEDICATION CHARGE: Performed by: STUDENT IN AN ORGANIZED HEALTH CARE EDUCATION/TRAINING PROGRAM

## 2024-09-17 ENCOUNTER — PHARMACY VISIT (OUTPATIENT)
Dept: PHARMACY | Facility: MEDICAL CENTER | Age: 64
End: 2024-09-17
Payer: COMMERCIAL

## 2024-09-24 PROCEDURE — RXMED WILLOW AMBULATORY MEDICATION CHARGE: Performed by: STUDENT IN AN ORGANIZED HEALTH CARE EDUCATION/TRAINING PROGRAM

## 2024-09-27 ENCOUNTER — PHARMACY VISIT (OUTPATIENT)
Dept: PHARMACY | Facility: MEDICAL CENTER | Age: 64
End: 2024-09-27
Payer: COMMERCIAL

## 2024-10-02 ENCOUNTER — PATIENT MESSAGE (OUTPATIENT)
Dept: HEALTH INFORMATION MANAGEMENT | Facility: OTHER | Age: 64
End: 2024-10-02

## 2024-10-12 ENCOUNTER — APPOINTMENT (OUTPATIENT)
Dept: LAB | Facility: MEDICAL CENTER | Age: 64
End: 2024-10-12
Payer: COMMERCIAL

## 2024-10-24 PROCEDURE — RXMED WILLOW AMBULATORY MEDICATION CHARGE: Performed by: STUDENT IN AN ORGANIZED HEALTH CARE EDUCATION/TRAINING PROGRAM

## 2024-10-25 PROCEDURE — RXMED WILLOW AMBULATORY MEDICATION CHARGE: Performed by: STUDENT IN AN ORGANIZED HEALTH CARE EDUCATION/TRAINING PROGRAM

## 2024-10-25 PROCEDURE — RXMED WILLOW AMBULATORY MEDICATION CHARGE: Performed by: INTERNAL MEDICINE

## 2024-10-28 ENCOUNTER — PHARMACY VISIT (OUTPATIENT)
Dept: PHARMACY | Facility: MEDICAL CENTER | Age: 64
End: 2024-10-28
Payer: COMMERCIAL

## 2024-10-28 RX ORDER — DAPAGLIFLOZIN AND METFORMIN HYDROCHLORIDE 5; 1000 MG/1; MG/1
TABLET, FILM COATED, EXTENDED RELEASE ORAL
Qty: 60 TABLET | Refills: 2 | Status: CANCELLED | OUTPATIENT
Start: 2024-10-24

## 2024-10-28 RX ORDER — ATORVASTATIN CALCIUM 40 MG/1
40 TABLET, FILM COATED ORAL DAILY
Qty: 30 TABLET | Refills: 2 | Status: CANCELLED | OUTPATIENT
Start: 2024-10-24

## 2024-11-04 RX ORDER — DAPAGLIFLOZIN AND METFORMIN HYDROCHLORIDE 5; 1000 MG/1; MG/1
TABLET, FILM COATED, EXTENDED RELEASE ORAL
Qty: 60 TABLET | Refills: 2 | OUTPATIENT
Start: 2024-11-04

## 2024-11-04 RX ORDER — ATORVASTATIN CALCIUM 40 MG/1
40 TABLET, FILM COATED ORAL DAILY
Qty: 30 TABLET | Refills: 2 | OUTPATIENT
Start: 2024-11-04

## 2024-11-09 ENCOUNTER — HOSPITAL ENCOUNTER (OUTPATIENT)
Dept: LAB | Facility: MEDICAL CENTER | Age: 64
End: 2024-11-09
Attending: STUDENT IN AN ORGANIZED HEALTH CARE EDUCATION/TRAINING PROGRAM
Payer: COMMERCIAL

## 2024-11-09 LAB
CORTIS SERPL-MCNC: 1.1 UG/DL (ref 0–23)
EST. AVERAGE GLUCOSE BLD GHB EST-MCNC: 212 MG/DL
HBA1C MFR BLD: 9 % (ref 4–5.6)

## 2024-11-09 PROCEDURE — 83036 HEMOGLOBIN GLYCOSYLATED A1C: CPT

## 2024-11-09 PROCEDURE — 80299 QUANTITATIVE ASSAY DRUG: CPT

## 2024-11-09 PROCEDURE — 36415 COLL VENOUS BLD VENIPUNCTURE: CPT

## 2024-11-09 PROCEDURE — 82024 ASSAY OF ACTH: CPT

## 2024-11-09 PROCEDURE — 82533 TOTAL CORTISOL: CPT

## 2024-11-12 LAB — ACTH PLAS-MCNC: 3.1 PG/ML (ref 7.2–63.3)

## 2024-11-14 LAB — DEXAMETHASONE SERPL-MCNC: 212.1 NG/DL

## 2024-12-02 PROCEDURE — RXMED WILLOW AMBULATORY MEDICATION CHARGE: Performed by: INTERNAL MEDICINE

## 2024-12-02 PROCEDURE — RXMED WILLOW AMBULATORY MEDICATION CHARGE: Performed by: STUDENT IN AN ORGANIZED HEALTH CARE EDUCATION/TRAINING PROGRAM

## 2024-12-03 ENCOUNTER — PHARMACY VISIT (OUTPATIENT)
Dept: PHARMACY | Facility: MEDICAL CENTER | Age: 64
End: 2024-12-03
Payer: COMMERCIAL

## 2024-12-03 PROCEDURE — RXMED WILLOW AMBULATORY MEDICATION CHARGE: Performed by: PHYSICIAN ASSISTANT

## 2024-12-03 RX ORDER — ATORVASTATIN CALCIUM 40 MG/1
TABLET, FILM COATED ORAL
Qty: 90 TABLET | Refills: 1 | Status: SHIPPED | OUTPATIENT
Start: 2024-12-03

## 2024-12-03 NOTE — TELEPHONE ENCOUNTER
Received request via: Pharmacy    Was the patient seen in the last year in this department? Yes    Does the patient have an active prescription (recently filled or refills available) for medication(s) requested? No    Pharmacy Name: [email or web address]    Does the patient have [facility] Plus and need 100-day supply? (This applies to ALL medications) Patient does not have SCP

## 2024-12-09 PROCEDURE — RXMED WILLOW AMBULATORY MEDICATION CHARGE: Performed by: INTERNAL MEDICINE

## 2024-12-10 ENCOUNTER — PHARMACY VISIT (OUTPATIENT)
Dept: PHARMACY | Facility: MEDICAL CENTER | Age: 64
End: 2024-12-10
Payer: COMMERCIAL

## 2024-12-26 ENCOUNTER — PHARMACY VISIT (OUTPATIENT)
Dept: PHARMACY | Facility: MEDICAL CENTER | Age: 64
End: 2024-12-26
Payer: COMMERCIAL

## 2024-12-26 PROCEDURE — RXMED WILLOW AMBULATORY MEDICATION CHARGE: Performed by: INTERNAL MEDICINE

## 2024-12-26 PROCEDURE — RXMED WILLOW AMBULATORY MEDICATION CHARGE: Performed by: STUDENT IN AN ORGANIZED HEALTH CARE EDUCATION/TRAINING PROGRAM

## 2024-12-26 RX ORDER — COVID-19 VACCINE, MRNA 0.04 MG/.418ML
0.3 INJECTION, SUSPENSION INTRAMUSCULAR
Qty: 0.3 ML | Refills: 0 | OUTPATIENT
Start: 2024-12-26

## 2024-12-26 RX ORDER — INFLUENZA A VIRUS A/VICTORIA/4897/2022 IVR-238 (H1N1) ANTIGEN (FORMALDEHYDE INACTIVATED), INFLUENZA A VIRUS A/CALIFORNIA/122/2022 SAN-022 (H3N2) ANTIGEN (FORMALDEHYDE INACTIVATED), AND INFLUENZA B VIRUS B/MICHIGAN/01/2021 ANTIGEN (FORMALDEHYDE INACTIVATED) 15; 15; 15 MG/.5ML; MG/.5ML; MG/.5ML
0.5 INJECTION, SUSPENSION INTRAMUSCULAR
Qty: 0.5 ML | Refills: 0 | OUTPATIENT
Start: 2024-12-26

## 2024-12-30 ENCOUNTER — PHARMACY VISIT (OUTPATIENT)
Dept: PHARMACY | Facility: MEDICAL CENTER | Age: 64
End: 2024-12-30
Payer: COMMERCIAL

## 2024-12-30 PROCEDURE — RXMED WILLOW AMBULATORY MEDICATION CHARGE: Performed by: STUDENT IN AN ORGANIZED HEALTH CARE EDUCATION/TRAINING PROGRAM

## 2024-12-30 RX ORDER — DAPAGLIFLOZIN AND METFORMIN HYDROCHLORIDE 5; 1000 MG/1; MG/1
TABLET, FILM COATED, EXTENDED RELEASE ORAL
Qty: 180 TABLET | Refills: 0 | OUTPATIENT
Start: 2024-12-30

## 2024-12-30 RX ORDER — LOSARTAN POTASSIUM 50 MG/1
TABLET ORAL
Qty: 90 TABLET | Refills: 0 | OUTPATIENT
Start: 2024-12-30

## 2024-12-30 RX ORDER — CARVEDILOL 12.5 MG/1
TABLET ORAL
Qty: 180 TABLET | Refills: 0 | OUTPATIENT
Start: 2024-12-30

## 2024-12-30 RX ORDER — INSULIN GLARGINE 300 U/ML
INJECTION, SOLUTION SUBCUTANEOUS
Qty: 18 ML | Refills: 0 | OUTPATIENT
Start: 2024-12-30

## 2024-12-30 RX ORDER — GLIPIZIDE 2.5 MG/1
TABLET, EXTENDED RELEASE ORAL
Qty: 30 TABLET | Refills: 0 | OUTPATIENT
Start: 2024-12-30

## 2024-12-30 RX ORDER — INSULIN LISPRO 100 [IU]/ML
INJECTION, SOLUTION INTRAVENOUS; SUBCUTANEOUS
Qty: 27 ML | Refills: 0 | OUTPATIENT
Start: 2024-12-30

## 2024-12-30 RX ORDER — ATORVASTATIN CALCIUM 40 MG/1
TABLET, FILM COATED ORAL
Qty: 90 TABLET | Refills: 0 | OUTPATIENT
Start: 2024-12-30

## 2025-01-02 PROCEDURE — RXMED WILLOW AMBULATORY MEDICATION CHARGE: Performed by: STUDENT IN AN ORGANIZED HEALTH CARE EDUCATION/TRAINING PROGRAM

## 2025-01-07 ENCOUNTER — PHARMACY VISIT (OUTPATIENT)
Dept: PHARMACY | Facility: MEDICAL CENTER | Age: 65
End: 2025-01-07
Payer: COMMERCIAL

## 2025-01-20 PROCEDURE — RXMED WILLOW AMBULATORY MEDICATION CHARGE: Performed by: STUDENT IN AN ORGANIZED HEALTH CARE EDUCATION/TRAINING PROGRAM

## 2025-01-29 ENCOUNTER — HOSPITAL ENCOUNTER (OUTPATIENT)
Dept: LAB | Facility: MEDICAL CENTER | Age: 65
End: 2025-01-29
Attending: INTERNAL MEDICINE
Payer: COMMERCIAL

## 2025-01-29 LAB
ALBUMIN SERPL BCP-MCNC: 4 G/DL (ref 3.2–4.9)
ALBUMIN/GLOB SERPL: 1.4 G/DL
ALP SERPL-CCNC: 61 U/L (ref 30–99)
ALT SERPL-CCNC: 10 U/L (ref 2–50)
ANION GAP SERPL CALC-SCNC: 11 MMOL/L (ref 7–16)
AST SERPL-CCNC: 10 U/L (ref 12–45)
BILIRUB SERPL-MCNC: 0.3 MG/DL (ref 0.1–1.5)
BUN SERPL-MCNC: 15 MG/DL (ref 8–22)
CALCIUM ALBUM COR SERPL-MCNC: 9 MG/DL (ref 8.5–10.5)
CALCIUM SERPL-MCNC: 9 MG/DL (ref 8.5–10.5)
CHLORIDE SERPL-SCNC: 103 MMOL/L (ref 96–112)
CHOLEST SERPL-MCNC: 146 MG/DL (ref 100–199)
CO2 SERPL-SCNC: 26 MMOL/L (ref 20–33)
CREAT SERPL-MCNC: 0.81 MG/DL (ref 0.5–1.4)
EST. AVERAGE GLUCOSE BLD GHB EST-MCNC: 206 MG/DL
FASTING STATUS PATIENT QL REPORTED: NORMAL
GFR SERPLBLD CREATININE-BSD FMLA CKD-EPI: 98 ML/MIN/1.73 M 2
GLOBULIN SER CALC-MCNC: 2.9 G/DL (ref 1.9–3.5)
GLUCOSE SERPL-MCNC: 91 MG/DL (ref 65–99)
HBA1C MFR BLD: 8.8 % (ref 4–5.6)
HDLC SERPL-MCNC: 46 MG/DL
LDLC SERPL CALC-MCNC: 84 MG/DL
PHOSPHATE SERPL-MCNC: 4.1 MG/DL (ref 2.5–4.5)
POTASSIUM SERPL-SCNC: 4.1 MMOL/L (ref 3.6–5.5)
PROT SERPL-MCNC: 6.9 G/DL (ref 6–8.2)
SODIUM SERPL-SCNC: 140 MMOL/L (ref 135–145)
TRIGL SERPL-MCNC: 81 MG/DL (ref 0–149)

## 2025-01-29 PROCEDURE — 83036 HEMOGLOBIN GLYCOSYLATED A1C: CPT

## 2025-01-29 PROCEDURE — 80053 COMPREHEN METABOLIC PANEL: CPT

## 2025-01-29 PROCEDURE — 36415 COLL VENOUS BLD VENIPUNCTURE: CPT

## 2025-01-29 PROCEDURE — 80061 LIPID PANEL: CPT

## 2025-01-29 PROCEDURE — 84100 ASSAY OF PHOSPHORUS: CPT

## 2025-01-30 PROCEDURE — RXMED WILLOW AMBULATORY MEDICATION CHARGE: Performed by: STUDENT IN AN ORGANIZED HEALTH CARE EDUCATION/TRAINING PROGRAM

## 2025-02-07 ENCOUNTER — PHARMACY VISIT (OUTPATIENT)
Dept: PHARMACY | Facility: MEDICAL CENTER | Age: 65
End: 2025-02-07
Payer: COMMERCIAL

## 2025-02-19 PROCEDURE — RXMED WILLOW AMBULATORY MEDICATION CHARGE: Performed by: INTERNAL MEDICINE

## 2025-02-21 ENCOUNTER — PHARMACY VISIT (OUTPATIENT)
Dept: PHARMACY | Facility: MEDICAL CENTER | Age: 65
End: 2025-02-21
Payer: COMMERCIAL

## 2025-03-06 PROCEDURE — RXMED WILLOW AMBULATORY MEDICATION CHARGE: Performed by: STUDENT IN AN ORGANIZED HEALTH CARE EDUCATION/TRAINING PROGRAM

## 2025-03-12 ENCOUNTER — PHARMACY VISIT (OUTPATIENT)
Dept: PHARMACY | Facility: MEDICAL CENTER | Age: 65
End: 2025-03-12
Payer: COMMERCIAL

## 2025-03-14 PROCEDURE — RXMED WILLOW AMBULATORY MEDICATION CHARGE: Performed by: INTERNAL MEDICINE

## 2025-03-14 PROCEDURE — RXMED WILLOW AMBULATORY MEDICATION CHARGE: Performed by: STUDENT IN AN ORGANIZED HEALTH CARE EDUCATION/TRAINING PROGRAM

## 2025-03-17 ENCOUNTER — PHARMACY VISIT (OUTPATIENT)
Dept: PHARMACY | Facility: MEDICAL CENTER | Age: 65
End: 2025-03-17
Payer: COMMERCIAL

## 2025-03-28 ENCOUNTER — PHARMACY VISIT (OUTPATIENT)
Dept: PHARMACY | Facility: MEDICAL CENTER | Age: 65
End: 2025-03-28
Payer: COMMERCIAL

## 2025-03-28 PROCEDURE — RXMED WILLOW AMBULATORY MEDICATION CHARGE: Performed by: STUDENT IN AN ORGANIZED HEALTH CARE EDUCATION/TRAINING PROGRAM

## 2025-03-28 RX ORDER — PREDNISOLONE ACETATE 10 MG/ML
SUSPENSION/ DROPS OPHTHALMIC
Qty: 5 ML | Refills: 0 | OUTPATIENT
Start: 2025-03-28

## 2025-04-02 PROCEDURE — RXMED WILLOW AMBULATORY MEDICATION CHARGE: Performed by: STUDENT IN AN ORGANIZED HEALTH CARE EDUCATION/TRAINING PROGRAM

## 2025-04-03 ENCOUNTER — PHARMACY VISIT (OUTPATIENT)
Dept: PHARMACY | Facility: MEDICAL CENTER | Age: 65
End: 2025-04-03
Payer: COMMERCIAL

## 2025-04-16 PROCEDURE — RXMED WILLOW AMBULATORY MEDICATION CHARGE: Performed by: STUDENT IN AN ORGANIZED HEALTH CARE EDUCATION/TRAINING PROGRAM

## 2025-04-18 ENCOUNTER — PHARMACY VISIT (OUTPATIENT)
Dept: PHARMACY | Facility: MEDICAL CENTER | Age: 65
End: 2025-04-18
Payer: COMMERCIAL

## 2025-04-18 PROCEDURE — RXMED WILLOW AMBULATORY MEDICATION CHARGE: Performed by: STUDENT IN AN ORGANIZED HEALTH CARE EDUCATION/TRAINING PROGRAM

## 2025-04-18 RX ORDER — GLIPIZIDE 2.5 MG/1
TABLET, EXTENDED RELEASE ORAL
Qty: 30 TABLET | Refills: 1 | Status: CANCELLED | OUTPATIENT
Start: 2025-04-18

## 2025-04-22 ENCOUNTER — PHARMACY VISIT (OUTPATIENT)
Dept: PHARMACY | Facility: MEDICAL CENTER | Age: 65
End: 2025-04-22
Payer: COMMERCIAL

## 2025-04-28 RX ORDER — GLIPIZIDE 2.5 MG/1
TABLET, EXTENDED RELEASE ORAL
Qty: 30 TABLET | Refills: 1 | OUTPATIENT
Start: 2025-04-28

## 2025-05-05 ENCOUNTER — APPOINTMENT (OUTPATIENT)
Dept: MEDICAL GROUP | Facility: MEDICAL CENTER | Age: 65
End: 2025-05-05
Payer: COMMERCIAL

## 2025-05-07 PROCEDURE — RXMED WILLOW AMBULATORY MEDICATION CHARGE: Performed by: STUDENT IN AN ORGANIZED HEALTH CARE EDUCATION/TRAINING PROGRAM

## 2025-05-13 ENCOUNTER — PHARMACY VISIT (OUTPATIENT)
Dept: PHARMACY | Facility: MEDICAL CENTER | Age: 65
End: 2025-05-13
Payer: COMMERCIAL

## 2025-05-13 PROCEDURE — RXMED WILLOW AMBULATORY MEDICATION CHARGE: Performed by: STUDENT IN AN ORGANIZED HEALTH CARE EDUCATION/TRAINING PROGRAM

## 2025-05-13 RX ORDER — GLIPIZIDE 2.5 MG/1
TABLET, EXTENDED RELEASE ORAL
Qty: 30 TABLET | Refills: 0 | OUTPATIENT
Start: 2025-05-13

## 2025-05-23 ENCOUNTER — PHARMACY VISIT (OUTPATIENT)
Dept: PHARMACY | Facility: MEDICAL CENTER | Age: 65
End: 2025-05-23
Payer: COMMERCIAL

## 2025-05-23 PROCEDURE — RXMED WILLOW AMBULATORY MEDICATION CHARGE: Performed by: STUDENT IN AN ORGANIZED HEALTH CARE EDUCATION/TRAINING PROGRAM

## 2025-05-27 ENCOUNTER — PHARMACY VISIT (OUTPATIENT)
Dept: PHARMACY | Facility: MEDICAL CENTER | Age: 65
End: 2025-05-27
Payer: COMMERCIAL

## 2025-05-27 PROCEDURE — RXMED WILLOW AMBULATORY MEDICATION CHARGE: Performed by: STUDENT IN AN ORGANIZED HEALTH CARE EDUCATION/TRAINING PROGRAM
